# Patient Record
Sex: FEMALE | Race: BLACK OR AFRICAN AMERICAN | NOT HISPANIC OR LATINO | Employment: FULL TIME | URBAN - METROPOLITAN AREA
[De-identification: names, ages, dates, MRNs, and addresses within clinical notes are randomized per-mention and may not be internally consistent; named-entity substitution may affect disease eponyms.]

---

## 2019-04-19 ENCOUNTER — HOSPITAL ENCOUNTER (EMERGENCY)
Facility: HOSPITAL | Age: 29
Discharge: HOME | End: 2019-04-19
Attending: EMERGENCY MEDICINE
Payer: COMMERCIAL

## 2019-04-19 VITALS
TEMPERATURE: 101 F | HEART RATE: 114 BPM | OXYGEN SATURATION: 97 % | HEIGHT: 64 IN | SYSTOLIC BLOOD PRESSURE: 147 MMHG | BODY MASS INDEX: 25.33 KG/M2 | DIASTOLIC BLOOD PRESSURE: 79 MMHG | WEIGHT: 148.38 LBS | RESPIRATION RATE: 18 BRPM

## 2019-04-19 DIAGNOSIS — R09.81 SINUS CONGESTION: ICD-10-CM

## 2019-04-19 DIAGNOSIS — R50.9 FEVER, UNSPECIFIED FEVER CAUSE: ICD-10-CM

## 2019-04-19 DIAGNOSIS — R51.9 NONINTRACTABLE HEADACHE, UNSPECIFIED CHRONICITY PATTERN, UNSPECIFIED HEADACHE TYPE: Primary | ICD-10-CM

## 2019-04-19 LAB
INFLUENZA A, MOLECULAR: NEGATIVE
INFLUENZA B, MOLECULAR: NEGATIVE
SPECIMEN SOURCE: NORMAL

## 2019-04-19 PROCEDURE — 87502 INFLUENZA DNA AMP PROBE: CPT

## 2019-04-19 PROCEDURE — 25000003 PHARM REV CODE 250: Performed by: PHYSICIAN ASSISTANT

## 2019-04-19 PROCEDURE — 99284 EMERGENCY DEPT VISIT MOD MDM: CPT

## 2019-04-19 RX ORDER — AMOXICILLIN AND CLAVULANATE POTASSIUM 875; 125 MG/1; MG/1
1 TABLET, FILM COATED ORAL 2 TIMES DAILY
Qty: 20 TABLET | Refills: 0 | Status: SHIPPED | OUTPATIENT
Start: 2019-04-19 | End: 2019-04-29

## 2019-04-19 RX ORDER — BUTALBITAL, ACETAMINOPHEN AND CAFFEINE 50; 325; 40 MG/1; MG/1; MG/1
2 TABLET ORAL EVERY 8 HOURS PRN
Qty: 18 TABLET | Refills: 0 | Status: SHIPPED | OUTPATIENT
Start: 2019-04-19 | End: 2019-05-19

## 2019-04-19 RX ORDER — FLUTICASONE PROPIONATE 50 MCG
2 SPRAY, SUSPENSION (ML) NASAL DAILY
Qty: 15 G | Refills: 0 | Status: SHIPPED | OUTPATIENT
Start: 2019-04-19 | End: 2023-10-10

## 2019-04-19 RX ORDER — BUTALBITAL, ACETAMINOPHEN AND CAFFEINE 50; 325; 40 MG/1; MG/1; MG/1
2 TABLET ORAL
Status: COMPLETED | OUTPATIENT
Start: 2019-04-19 | End: 2019-04-19

## 2019-04-19 RX ORDER — IBUPROFEN 600 MG/1
600 TABLET ORAL
Status: COMPLETED | OUTPATIENT
Start: 2019-04-19 | End: 2019-04-19

## 2019-04-19 RX ADMIN — IBUPROFEN 600 MG: 600 TABLET, FILM COATED ORAL at 02:04

## 2019-04-19 RX ADMIN — BUTALBITAL, ACETAMINOPHEN, AND CAFFEINE 2 TABLET: 50; 325; 40 TABLET ORAL at 02:04

## 2023-01-23 ENCOUNTER — HOSPITAL ENCOUNTER (EMERGENCY)
Facility: HOSPITAL | Age: 33
Discharge: HOME | End: 2023-01-23
Attending: EMERGENCY MEDICINE
Payer: COMMERCIAL

## 2023-01-23 VITALS
BODY MASS INDEX: 21.53 KG/M2 | HEART RATE: 67 BPM | WEIGHT: 125.44 LBS | TEMPERATURE: 98 F | RESPIRATION RATE: 20 BRPM | SYSTOLIC BLOOD PRESSURE: 121 MMHG | OXYGEN SATURATION: 100 % | DIASTOLIC BLOOD PRESSURE: 71 MMHG

## 2023-01-23 DIAGNOSIS — N20.0 KIDNEY STONE: Primary | ICD-10-CM

## 2023-01-23 DIAGNOSIS — R10.9 FLANK PAIN: ICD-10-CM

## 2023-01-23 LAB
ALBUMIN SERPL BCP-MCNC: 4.7 G/DL (ref 3.5–5.2)
ALP SERPL-CCNC: 62 U/L (ref 55–135)
ALT SERPL W/O P-5'-P-CCNC: 8 U/L (ref 10–44)
ANION GAP SERPL CALC-SCNC: 12 MMOL/L (ref 8–16)
AST SERPL-CCNC: 17 U/L (ref 10–40)
B-HCG UR QL: NEGATIVE
BACTERIA #/AREA URNS HPF: ABNORMAL /HPF
BASOPHILS # BLD AUTO: 0.03 K/UL (ref 0–0.2)
BASOPHILS NFR BLD: 0.2 % (ref 0–1.9)
BILIRUB SERPL-MCNC: 0.2 MG/DL (ref 0.1–1)
BILIRUB UR QL STRIP: NEGATIVE
BUN SERPL-MCNC: 10 MG/DL (ref 6–20)
CALCIUM SERPL-MCNC: 9.5 MG/DL (ref 8.7–10.5)
CHLORIDE SERPL-SCNC: 105 MMOL/L (ref 95–110)
CLARITY UR: ABNORMAL
CO2 SERPL-SCNC: 22 MMOL/L (ref 23–29)
COLOR UR: YELLOW
CREAT SERPL-MCNC: 0.9 MG/DL (ref 0.5–1.4)
DIFFERENTIAL METHOD: ABNORMAL
EOSINOPHIL # BLD AUTO: 0 K/UL (ref 0–0.5)
EOSINOPHIL NFR BLD: 0.1 % (ref 0–8)
ERYTHROCYTE [DISTWIDTH] IN BLOOD BY AUTOMATED COUNT: 12.5 % (ref 11.5–14.5)
EST. GFR  (NO RACE VARIABLE): >60 ML/MIN/1.73 M^2
GLUCOSE SERPL-MCNC: 123 MG/DL (ref 70–110)
GLUCOSE UR QL STRIP: NEGATIVE
HCT VFR BLD AUTO: 39.2 % (ref 37–48.5)
HCV AB SERPL QL IA: NEGATIVE
HEP C VIRUS HOLD SPECIMEN: NORMAL
HGB BLD-MCNC: 13.1 G/DL (ref 12–16)
HGB UR QL STRIP: ABNORMAL
HIV 1+2 AB+HIV1 P24 AG SERPL QL IA: NEGATIVE
HYALINE CASTS #/AREA URNS LPF: 1 /LPF
IMM GRANULOCYTES # BLD AUTO: 0.05 K/UL (ref 0–0.04)
IMM GRANULOCYTES NFR BLD AUTO: 0.4 % (ref 0–0.5)
KETONES UR QL STRIP: ABNORMAL
LEUKOCYTE ESTERASE UR QL STRIP: NEGATIVE
LYMPHOCYTES # BLD AUTO: 1.7 K/UL (ref 1–4.8)
LYMPHOCYTES NFR BLD: 12.5 % (ref 18–48)
MCH RBC QN AUTO: 27.9 PG (ref 27–31)
MCHC RBC AUTO-ENTMCNC: 33.4 G/DL (ref 32–36)
MCV RBC AUTO: 84 FL (ref 82–98)
MICROSCOPIC COMMENT: ABNORMAL
MONOCYTES # BLD AUTO: 0.5 K/UL (ref 0.3–1)
MONOCYTES NFR BLD: 3.4 % (ref 4–15)
NEUTROPHILS # BLD AUTO: 11.2 K/UL (ref 1.8–7.7)
NEUTROPHILS NFR BLD: 83.4 % (ref 38–73)
NITRITE UR QL STRIP: NEGATIVE
NRBC BLD-RTO: 0 /100 WBC
PH UR STRIP: 6 [PH] (ref 5–8)
PLATELET # BLD AUTO: 273 K/UL (ref 150–450)
PMV BLD AUTO: 10.5 FL (ref 9.2–12.9)
POTASSIUM SERPL-SCNC: 3.6 MMOL/L (ref 3.5–5.1)
PROT SERPL-MCNC: 8 G/DL (ref 6–8.4)
PROT UR QL STRIP: ABNORMAL
RBC # BLD AUTO: 4.69 M/UL (ref 4–5.4)
RBC #/AREA URNS HPF: 11 /HPF (ref 0–4)
SODIUM SERPL-SCNC: 139 MMOL/L (ref 136–145)
SP GR UR STRIP: 1.03 (ref 1–1.03)
SQUAMOUS #/AREA URNS HPF: 17 /HPF
URN SPEC COLLECT METH UR: ABNORMAL
UROBILINOGEN UR STRIP-ACNC: NEGATIVE EU/DL
WBC # BLD AUTO: 13.43 K/UL (ref 3.9–12.7)
WBC #/AREA URNS HPF: 1 /HPF (ref 0–5)
WBC CLUMPS URNS QL MICRO: ABNORMAL

## 2023-01-23 PROCEDURE — 87389 HIV-1 AG W/HIV-1&-2 AB AG IA: CPT | Performed by: EMERGENCY MEDICINE

## 2023-01-23 PROCEDURE — 81025 URINE PREGNANCY TEST: CPT | Performed by: EMERGENCY MEDICINE

## 2023-01-23 PROCEDURE — 63600175 PHARM REV CODE 636 W HCPCS: Performed by: EMERGENCY MEDICINE

## 2023-01-23 PROCEDURE — 96372 THER/PROPH/DIAG INJ SC/IM: CPT | Performed by: EMERGENCY MEDICINE

## 2023-01-23 PROCEDURE — 86803 HEPATITIS C AB TEST: CPT | Performed by: EMERGENCY MEDICINE

## 2023-01-23 PROCEDURE — 99285 EMERGENCY DEPT VISIT HI MDM: CPT | Mod: 25

## 2023-01-23 PROCEDURE — 80053 COMPREHEN METABOLIC PANEL: CPT | Performed by: NURSE PRACTITIONER

## 2023-01-23 PROCEDURE — 81000 URINALYSIS NONAUTO W/SCOPE: CPT | Performed by: EMERGENCY MEDICINE

## 2023-01-23 PROCEDURE — 85025 COMPLETE CBC W/AUTO DIFF WBC: CPT | Performed by: NURSE PRACTITIONER

## 2023-01-23 RX ORDER — TAMSULOSIN HYDROCHLORIDE 0.4 MG/1
0.4 CAPSULE ORAL DAILY
Qty: 30 CAPSULE | Refills: 0 | Status: SHIPPED | OUTPATIENT
Start: 2023-01-23 | End: 2023-01-23 | Stop reason: SDUPTHER

## 2023-01-23 RX ORDER — TAMSULOSIN HYDROCHLORIDE 0.4 MG/1
0.4 CAPSULE ORAL DAILY
Qty: 30 CAPSULE | Refills: 0 | Status: SHIPPED | OUTPATIENT
Start: 2023-01-23 | End: 2023-10-10

## 2023-01-23 RX ORDER — KETOROLAC TROMETHAMINE 30 MG/ML
15 INJECTION, SOLUTION INTRAMUSCULAR; INTRAVENOUS
Status: COMPLETED | OUTPATIENT
Start: 2023-01-23 | End: 2023-01-23

## 2023-01-23 RX ORDER — PROMETHAZINE HYDROCHLORIDE 25 MG/1
25 TABLET ORAL EVERY 6 HOURS PRN
Qty: 15 TABLET | Refills: 0 | Status: SHIPPED | OUTPATIENT
Start: 2023-01-23 | End: 2023-01-23 | Stop reason: SDUPTHER

## 2023-01-23 RX ORDER — CEFUROXIME AXETIL 500 MG/1
500 TABLET ORAL 2 TIMES DAILY
Qty: 20 TABLET | Refills: 0 | Status: SHIPPED | OUTPATIENT
Start: 2023-01-23 | End: 2023-02-02

## 2023-01-23 RX ORDER — HYDROCODONE BITARTRATE AND ACETAMINOPHEN 5; 325 MG/1; MG/1
1 TABLET ORAL EVERY 4 HOURS PRN
Qty: 11 TABLET | Refills: 0 | Status: SHIPPED | OUTPATIENT
Start: 2023-01-23 | End: 2023-01-28

## 2023-01-23 RX ORDER — ONDANSETRON 2 MG/ML
8 INJECTION INTRAMUSCULAR; INTRAVENOUS
Status: DISCONTINUED | OUTPATIENT
Start: 2023-01-23 | End: 2023-01-23

## 2023-01-23 RX ORDER — HYDROCODONE BITARTRATE AND ACETAMINOPHEN 5; 325 MG/1; MG/1
1 TABLET ORAL EVERY 4 HOURS PRN
Qty: 11 TABLET | Refills: 0 | Status: SHIPPED | OUTPATIENT
Start: 2023-01-23 | End: 2023-01-23 | Stop reason: SDUPTHER

## 2023-01-23 RX ORDER — ONDANSETRON 2 MG/ML
4 INJECTION INTRAMUSCULAR; INTRAVENOUS
Status: COMPLETED | OUTPATIENT
Start: 2023-01-23 | End: 2023-01-23

## 2023-01-23 RX ORDER — PROMETHAZINE HYDROCHLORIDE 25 MG/1
25 TABLET ORAL EVERY 6 HOURS PRN
Qty: 15 TABLET | Refills: 0 | Status: SHIPPED | OUTPATIENT
Start: 2023-01-23 | End: 2023-10-10

## 2023-01-23 RX ORDER — CEFUROXIME AXETIL 500 MG/1
500 TABLET ORAL 2 TIMES DAILY
Qty: 20 TABLET | Refills: 0 | Status: SHIPPED | OUTPATIENT
Start: 2023-01-23 | End: 2023-01-23 | Stop reason: SDUPTHER

## 2023-01-23 RX ORDER — KETOROLAC TROMETHAMINE 30 MG/ML
15 INJECTION, SOLUTION INTRAMUSCULAR; INTRAVENOUS
Status: DISCONTINUED | OUTPATIENT
Start: 2023-01-23 | End: 2023-01-23

## 2023-01-23 RX ADMIN — ONDANSETRON 4 MG: 2 INJECTION INTRAMUSCULAR; INTRAVENOUS at 09:01

## 2023-01-23 RX ADMIN — KETOROLAC TROMETHAMINE 15 MG: 30 INJECTION, SOLUTION INTRAMUSCULAR; INTRAVENOUS at 09:01

## 2023-01-23 NOTE — ED PROVIDER NOTES
SCRIBE #1 NOTE: I, Fouzia Palmer, am scribing for, and in the presence of, Bernard Gracia MD. I have scribed the entire note.       History     Chief Complaint   Patient presents with    Flank Pain     Left side flank pain with N/V.      Review of patient's allergies indicates:  No Known Allergies      History of Present Illness     HPI    1/23/2023, 9:18 AM  History obtained from the patient      History of Present Illness: Waldemar Mendoza is a 32 y.o. female patient who presents to the Emergency Department for evaluation of L sided flank pain which onset today. Symptoms are constant and moderate in severity. No mitigating or exacerbating factors reported. Associated sxs include nausea and vomiting. Patient denies any fever, chills, CP, SOB, lightheadedness, numbness, weakness, HA, and all other sxs at this time. No prior Tx reported. No further complaints or concerns at this time.       Arrival mode: Personal vehicle    PCP: Primary Doctor No        Past Medical History:  No past medical history on file.    Past Surgical History:  No past surgical history on file.      Family History:  No family history on file.    Social History:  Social History     Tobacco Use    Smoking status: Not on file    Smokeless tobacco: Not on file   Substance and Sexual Activity    Alcohol use: Not on file    Drug use: Not on file    Sexual activity: Not on file        Review of Systems     Review of Systems   Constitutional:  Negative for fever.   HENT:  Negative for sore throat.    Respiratory:  Negative for shortness of breath.    Cardiovascular:  Negative for chest pain.   Gastrointestinal:  Positive for nausea and vomiting.   Genitourinary:  Positive for flank pain (L sided). Negative for dysuria.   Musculoskeletal:  Negative for back pain.   Skin:  Negative for rash.   Neurological:  Negative for weakness, light-headedness, numbness and headaches.   Hematological:  Does not bruise/bleed easily.   All other systems reviewed  and are negative.     Physical Exam     Initial Vitals [01/23/23 0759]   BP Pulse Resp Temp SpO2   121/71 67 20 97.8 °F (36.6 °C) 100 %      MAP       --          Physical Exam  Nursing Notes and Vital Signs Reviewed.  Constitutional: Patient is in no acute distress. Well-developed and well-nourished.  Head: Atraumatic. Normocephalic.  Eyes: PERRL. EOM intact. Conjunctivae are not pale. No scleral icterus.  ENT: Mucous membranes are moist.   Neck: Supple. Full ROM.   Cardiovascular: Regular rate. Regular rhythm. No murmurs, rubs, or gallops. Distal pulses are 2+ and symmetric.  Pulmonary/Chest: No respiratory distress. Clear to auscultation bilaterally. No wheezing or rales.  Abdominal: Soft and non-distended.  There is no tenderness.  No rebound, guarding, or rigidity.   Musculoskeletal: Moves all extremities. No obvious deformities. No edema.   Skin: Warm and dry.  Neurological:  Alert, awake, and appropriate.  Normal speech.  No acute focal neurological deficits are appreciated.  Psychiatric: Normal affect. Good eye contact. Appropriate in content.     ED Course   Procedures  ED Vital Signs:  Vitals:    01/23/23 0759   BP: 121/71   Pulse: 67   Resp: 20   Temp: 97.8 °F (36.6 °C)   TempSrc: Oral   SpO2: 100%   Weight: 56.9 kg (125 lb 7.1 oz)       Abnormal Lab Results:  Labs Reviewed   URINALYSIS, REFLEX TO URINE CULTURE - Abnormal; Notable for the following components:       Result Value    Appearance, UA Hazy (*)     Protein, UA 1+ (*)     Ketones, UA 1+ (*)     Occult Blood UA 3+ (*)     All other components within normal limits    Narrative:     Specimen Source->Urine   CBC W/ AUTO DIFFERENTIAL - Abnormal; Notable for the following components:    WBC 13.43 (*)     Gran # (ANC) 11.2 (*)     Immature Grans (Abs) 0.05 (*)     Gran % 83.4 (*)     Lymph % 12.5 (*)     Mono % 3.4 (*)     All other components within normal limits    Narrative:     Release to patient->Immediate   COMPREHENSIVE METABOLIC PANEL -  Abnormal; Notable for the following components:    CO2 22 (*)     Glucose 123 (*)     ALT 8 (*)     All other components within normal limits    Narrative:     Release to patient->Immediate   URINALYSIS MICROSCOPIC - Abnormal; Notable for the following components:    RBC, UA 11 (*)     WBC Clumps, UA Occasional (*)     All other components within normal limits    Narrative:     Specimen Source->Urine   PREGNANCY TEST, URINE RAPID    Narrative:     For women of childbearing age w/o hysterectomy  Specimen Source->Urine   HIV 1 / 2 ANTIBODY    Narrative:     Release to patient->Immediate   HEPATITIS C ANTIBODY    Narrative:     Release to patient->Immediate   HEP C VIRUS HOLD SPECIMEN    Narrative:     Release to patient->Immediate        All Lab Results:  Results for orders placed or performed during the hospital encounter of 01/23/23   Urinalysis, Reflex to Urine Culture Urine, Clean Catch    Specimen: Urine   Result Value Ref Range    Specimen UA Urine, Clean Catch     Color, UA Yellow Yellow, Straw, Caprice    Appearance, UA Hazy (A) Clear    pH, UA 6.0 5.0 - 8.0    Specific Gravity, UA 1.030 1.005 - 1.030    Protein, UA 1+ (A) Negative    Glucose, UA Negative Negative    Ketones, UA 1+ (A) Negative    Bilirubin (UA) Negative Negative    Occult Blood UA 3+ (A) Negative    Nitrite, UA Negative Negative    Urobilinogen, UA Negative <2.0 EU/dL    Leukocytes, UA Negative Negative   Pregnancy, urine rapid   Result Value Ref Range    Preg Test, Ur Negative    HIV 1/2 Ag/Ab (4th Gen)   Result Value Ref Range    HIV 1/2 Ag/Ab Negative Negative   Hepatitis C Antibody   Result Value Ref Range    Hepatitis C Ab Negative Negative   HCV Virus Hold Specimen   Result Value Ref Range    HEP C Virus Hold Specimen Hold for HCV sendout    CBC auto differential   Result Value Ref Range    WBC 13.43 (H) 3.90 - 12.70 K/uL    RBC 4.69 4.00 - 5.40 M/uL    Hemoglobin 13.1 12.0 - 16.0 g/dL    Hematocrit 39.2 37.0 - 48.5 %    MCV 84 82 - 98 fL     MCH 27.9 27.0 - 31.0 pg    MCHC 33.4 32.0 - 36.0 g/dL    RDW 12.5 11.5 - 14.5 %    Platelets 273 150 - 450 K/uL    MPV 10.5 9.2 - 12.9 fL    Immature Granulocytes 0.4 0.0 - 0.5 %    Gran # (ANC) 11.2 (H) 1.8 - 7.7 K/uL    Immature Grans (Abs) 0.05 (H) 0.00 - 0.04 K/uL    Lymph # 1.7 1.0 - 4.8 K/uL    Mono # 0.5 0.3 - 1.0 K/uL    Eos # 0.0 0.0 - 0.5 K/uL    Baso # 0.03 0.00 - 0.20 K/uL    nRBC 0 0 /100 WBC    Gran % 83.4 (H) 38.0 - 73.0 %    Lymph % 12.5 (L) 18.0 - 48.0 %    Mono % 3.4 (L) 4.0 - 15.0 %    Eosinophil % 0.1 0.0 - 8.0 %    Basophil % 0.2 0.0 - 1.9 %    Differential Method Automated    Comprehensive metabolic panel   Result Value Ref Range    Sodium 139 136 - 145 mmol/L    Potassium 3.6 3.5 - 5.1 mmol/L    Chloride 105 95 - 110 mmol/L    CO2 22 (L) 23 - 29 mmol/L    Glucose 123 (H) 70 - 110 mg/dL    BUN 10 6 - 20 mg/dL    Creatinine 0.9 0.5 - 1.4 mg/dL    Calcium 9.5 8.7 - 10.5 mg/dL    Total Protein 8.0 6.0 - 8.4 g/dL    Albumin 4.7 3.5 - 5.2 g/dL    Total Bilirubin 0.2 0.1 - 1.0 mg/dL    Alkaline Phosphatase 62 55 - 135 U/L    AST 17 10 - 40 U/L    ALT 8 (L) 10 - 44 U/L    Anion Gap 12 8 - 16 mmol/L    eGFR >60 >60 mL/min/1.73 m^2   Urinalysis Microscopic   Result Value Ref Range    RBC, UA 11 (H) 0 - 4 /hpf    WBC, UA 1 0 - 5 /hpf    WBC Clumps, UA Occasional (A) None-Rare    Bacteria Rare None-Occ /hpf    Squam Epithel, UA 17 /hpf    Hyaline Casts, UA 1 0-1/lpf /lpf    Microscopic Comment SEE COMMENT        Imaging Results:  Imaging Results               CT Renal Stone Study ABD Pelvis WO (Final result)  Result time 01/23/23 10:12:39      Final result by Massimo Rousseau MD (01/23/23 10:12:39)                   Impression:      Mild left-sided obstructive uropathy with a 4 mm stone in the distal left ureter.  Nephrolithiasis, as above.    Small collection of air in the right posterior pelvic subcutaneous soft tissues.  Correlate for recent injection.  Infectious etiology not completely excluded  in the appropriate clinical setting.    Additional findings as above.    This report was flagged in Epic as abnormal.      Electronically signed by: Massimo Rousseau  Date:    01/23/2023  Time:    10:12               Narrative:    EXAMINATION:  CT RENAL STONE STUDY ABD PELVIS WO    CLINICAL HISTORY:  Flank pain, kidney stone suspected;    TECHNIQUE:  Low dose axial images, sagittal and coronal reformations were obtained from the lung bases to the pubic symphysis.  Contrast was not administered.  All CT scans at this location are performed using dose modulation techniques as appropriate to a performed exam including the following: Automated exposure control; adjustment of the mA and/or kV according to patient size (this includes techniques or standardized protocols for targeted exams where dose is matched to indication/reason for exam; i. e. extremities or head); use of iterative reconstruction technique.    COMPARISON:  CT abdomen pelvis 12/21/2014    FINDINGS:  LOWER CHEST: Unremarkable.    HEPATOBILIARY: Mildly enlarged liver.  Unremarkable gallbladder. Unremarkable bile ducts.    SPLEEN/ADRENAL/PANCREAS: Unremarkable.    GENITOURINARY: Numerous tiny nephroliths at the right kidney, few tiny left nephroliths.  Stones measure on the order of 1-3 mm.  Mild left-sided hydronephrosis and hydroureter with a 4 mm stone in the distal left ureter approaching the left UVJ.  Right ureter demonstrates no significant abnormality.    RETROPERITONEUM:  No pathologic adenopathy.    BOWEL: No obstruction or inflammation. No free air or ascites.    VASCULAR: No abdominal aortic aneurysm.    BODY WALL: Small collection of air in the right posterior pelvic subcutaneous soft tissues.    BONES: No acute fracture or aggressive osseous lesion. Slight levoconvex spine curvature.  L5-S1 discogenic degenerative changes.                                              The Emergency Provider reviewed the vital signs and test results, which are  outlined above.     ED Discussion       10:31 AM: Reassessed pt at this time. Discussed with pt all pertinent ED information and results. Discussed pt dx and plan of tx. Gave pt all f/u and return to the ED instructions. All questions and concerns were addressed at this time. Pt expresses understanding of information and instructions, and is comfortable with plan to discharge. Pt is stable for discharge.    I discussed with patient and/or family/caretaker that evaluation in the ED does not suggest any emergent or life threatening medical conditions requiring immediate intervention beyond what was provided in the ED, and I believe patient is safe for discharge.  Regardless, an unremarkable evaluation in the ED does not preclude the development or presence of a serious of life threatening condition. As such, patient was instructed to return immediately for any worsening or change in current symptoms.       Medical Decision Making:   Initial Assessment:   Left sided flank pain  Differential Diagnosis:   Kidney stone, UTI  Clinical Tests:   Lab Tests: Ordered and Reviewed  Radiological Study: Ordered and Reviewed  ED Management:  CT shows Kidney stone.  Will prescribe, flomax, pain and nause meds.          ED Medication(s):  Medications   ketorolac injection 15 mg (15 mg Intramuscular Given 1/23/23 0934)   ondansetron injection 4 mg (4 mg Intramuscular Given 1/23/23 0934)       Current Discharge Medication List        START taking these medications    Details   cefUROXime (CEFTIN) 500 MG tablet Take 1 tablet (500 mg total) by mouth 2 (two) times daily. for 10 days  Qty: 20 tablet, Refills: 0      HYDROcodone-acetaminophen (NORCO) 5-325 mg per tablet Take 1 tablet by mouth every 4 (four) hours as needed for Pain.  Qty: 11 tablet, Refills: 0    Comments: Quantity prescribed more than 7 day supply? No      promethazine (PHENERGAN) 25 MG tablet Take 1 tablet (25 mg total) by mouth every 6 (six) hours as needed for  Nausea.  Qty: 15 tablet, Refills: 0      tamsulosin (FLOMAX) 0.4 mg Cap Take 1 capsule (0.4 mg total) by mouth once daily.  Qty: 30 capsule, Refills: 0              Follow-up Information       Schedule an appointment as soon as possible for a visit  with The [address]    Specialty: Internal Medicine  Contact information:  [id number] [facility] [address] [phone]  Additional information:  Please park on the [address] side and use the Clinic entrance. Check in on the 2nd floor, to the right.                               Scribe Attestation:   Scribe #1: I performed the above scribed service and the documentation accurately describes the services I performed. I attest to the accuracy of the note.     Attending:   Physician Attestation Statement for Scribe #1: I, Bernard Gracia MD, personally performed the services described in this documentation, as scribed by Fouzia Palmer, in my presence, and it is both accurate and complete.           Clinical Impression       ICD-10-CM ICD-9-CM   1. Kidney stone  N20.0 592.0   2. Flank pain  R10.9 789.09       Disposition:   Disposition: Discharged  Condition: Stable       Bernard Gracia MD  01/23/23 1100

## 2023-01-23 NOTE — FIRST PROVIDER EVALUATION
Medical screening examination initiated.  I have conducted a focused provider triage encounter, findings are as follows:    Brief history of present illness:  32-year-old female with complaint of sudden onset of left lower back pain with radiation left lower abdomen since this morning.  Patient reports nausea and vomiting with the pain.    Vitals:    01/23/23 0759   BP: 121/71   BP Location: Right arm   Patient Position: Sitting   Pulse: 67   Resp: 20   Temp: 97.8 °F (36.6 °C)   TempSrc: Oral   SpO2: 100%   Weight: 56.9 kg (125 lb 7.1 oz)       Pertinent physical exam:  no abdominal tenderness

## 2023-10-03 ENCOUNTER — OFFICE VISIT (OUTPATIENT)
Dept: INTERNAL MEDICINE | Facility: CLINIC | Age: 33
End: 2023-10-03
Payer: COMMERCIAL

## 2023-10-03 VITALS
HEART RATE: 86 BPM | TEMPERATURE: 98 F | DIASTOLIC BLOOD PRESSURE: 62 MMHG | WEIGHT: 118.19 LBS | BODY MASS INDEX: 20.18 KG/M2 | HEIGHT: 64 IN | OXYGEN SATURATION: 96 % | RESPIRATION RATE: 18 BRPM | SYSTOLIC BLOOD PRESSURE: 104 MMHG

## 2023-10-03 DIAGNOSIS — R53.83 FATIGUE, UNSPECIFIED TYPE: ICD-10-CM

## 2023-10-03 DIAGNOSIS — R55 SYNCOPE, UNSPECIFIED SYNCOPE TYPE: ICD-10-CM

## 2023-10-03 DIAGNOSIS — R63.4 WEIGHT LOSS, UNINTENTIONAL: ICD-10-CM

## 2023-10-03 DIAGNOSIS — G43.909 MIGRAINE WITHOUT STATUS MIGRAINOSUS, NOT INTRACTABLE, UNSPECIFIED MIGRAINE TYPE: Primary | ICD-10-CM

## 2023-10-03 DIAGNOSIS — Z13.220 SCREENING FOR LIPOID DISORDERS: ICD-10-CM

## 2023-10-03 DIAGNOSIS — Z01.419 WELL FEMALE EXAM WITH ROUTINE GYNECOLOGICAL EXAM: ICD-10-CM

## 2023-10-03 PROCEDURE — 3078F DIAST BP <80 MM HG: CPT | Mod: CPTII,S$GLB,, | Performed by: FAMILY MEDICINE

## 2023-10-03 PROCEDURE — 3008F BODY MASS INDEX DOCD: CPT | Mod: CPTII,S$GLB,, | Performed by: FAMILY MEDICINE

## 2023-10-03 PROCEDURE — 99999 PR PBB SHADOW E&M-EST. PATIENT-LVL V: CPT | Mod: PBBFAC,,, | Performed by: FAMILY MEDICINE

## 2023-10-03 PROCEDURE — 3078F PR MOST RECENT DIASTOLIC BLOOD PRESSURE < 80 MM HG: ICD-10-PCS | Mod: CPTII,S$GLB,, | Performed by: FAMILY MEDICINE

## 2023-10-03 PROCEDURE — 3008F PR BODY MASS INDEX (BMI) DOCUMENTED: ICD-10-PCS | Mod: CPTII,S$GLB,, | Performed by: FAMILY MEDICINE

## 2023-10-03 PROCEDURE — 3074F PR MOST RECENT SYSTOLIC BLOOD PRESSURE < 130 MM HG: ICD-10-PCS | Mod: CPTII,S$GLB,, | Performed by: FAMILY MEDICINE

## 2023-10-03 PROCEDURE — 99204 OFFICE O/P NEW MOD 45 MIN: CPT | Mod: S$GLB,,, | Performed by: FAMILY MEDICINE

## 2023-10-03 PROCEDURE — 99204 PR OFFICE/OUTPT VISIT, NEW, LEVL IV, 45-59 MIN: ICD-10-PCS | Mod: S$GLB,,, | Performed by: FAMILY MEDICINE

## 2023-10-03 PROCEDURE — 99999 PR PBB SHADOW E&M-EST. PATIENT-LVL V: ICD-10-PCS | Mod: PBBFAC,,, | Performed by: FAMILY MEDICINE

## 2023-10-03 PROCEDURE — 3074F SYST BP LT 130 MM HG: CPT | Mod: CPTII,S$GLB,, | Performed by: FAMILY MEDICINE

## 2023-10-03 RX ORDER — ACETAMINOPHEN 500 MG
500 TABLET ORAL EVERY 6 HOURS PRN
COMMUNITY
End: 2023-10-10

## 2023-10-03 RX ORDER — IBUPROFEN 200 MG
200 TABLET ORAL EVERY 6 HOURS PRN
COMMUNITY

## 2023-10-03 NOTE — PATIENT INSTRUCTIONS
Try 3-5 minutes daily of guided meditation  [employer] - has free 10 day introduction  Simply Being  Calm  Simple Habit     When you feel yourself getting anxious/stressed take a breathing time out. Breathe in to count of 4, hold for count of 2, breathe out for count of 8; repeat 10 times and it should help you feel more calm.

## 2023-10-03 NOTE — PROGRESS NOTES
Subjective:       Patient ID: Waldemar Mendoza is a 33 y.o. female here today to establish care.    Chief Complaint: Establish Care and Headache    Patient presents to clinic today to establish care. Has seen Neurology for HA in approx . Diagnosed with migraine. Tried several different medications.     Wt Readings from Last 10 Encounters:  10/03/23 : 53.6 kg (118 lb 2.7 oz)  23 : 56.9 kg (125 lb 7.1 oz)  19 : 67.3 kg (148 lb 5.9 oz)    Reports weight 179 lbs in 2018 - she was seen by doctor and had normal lab evaluation at that time.    Reports no appetite - reports early satiety    She reports her mother  of lung cancer at age 45. Her brother is currently being treated for thyroid cancer, age 30.          Review of Systems   Constitutional:  Positive for fatigue (chronic) and unexpected weight change (chronic). Negative for chills and fever.   HENT:  Negative for congestion, dental problem, ear pain, hearing loss, rhinorrhea and trouble swallowing.    Eyes:  Negative for pain and visual disturbance.   Respiratory:  Negative for cough and shortness of breath.    Cardiovascular:  Negative for chest pain, palpitations and leg swelling.   Gastrointestinal:  Positive for abdominal pain (chronic, intermittent, h/o kidney stones). Negative for abdominal distention, blood in stool, constipation, diarrhea, nausea and vomiting.   Genitourinary:  Negative for difficulty urinating and vaginal discharge.   Musculoskeletal:  Negative for arthralgias and myalgias.   Skin:  Negative for rash.   Neurological:  Positive for syncope (7-8 episodes over last 2-3 years; LOC lasts seconds; reports prior to episodes feels hot, has abdominal pain and feels the need to have BM) and headaches (chronic). Negative for dizziness, weakness and numbness.   Hematological:  Positive for adenopathy (chronic, intermittent). Does not bruise/bleed easily.   Psychiatric/Behavioral:  Positive for sleep disturbance (chronic).  Negative for dysphoric mood. The patient is nervous/anxious.        Objective:      Physical Exam  Vitals reviewed.   Constitutional:       General: She is not in acute distress.     Appearance: She is well-developed.   HENT:      Head: Normocephalic and atraumatic.   Eyes:      General: Lids are normal. No scleral icterus.     Extraocular Movements: Extraocular movements intact.      Conjunctiva/sclera: Conjunctivae normal.      Pupils: Pupils are equal, round, and reactive to light.   Cardiovascular:      Rate and Rhythm: Normal rate and regular rhythm.      Heart sounds: No murmur heard.     No friction rub. No gallop.   Pulmonary:      Effort: Pulmonary effort is normal.      Breath sounds: Normal breath sounds. No decreased breath sounds, wheezing, rhonchi or rales.   Abdominal:      General: Bowel sounds are normal.      Palpations: Abdomen is soft. There is no mass.      Tenderness: There is no abdominal tenderness.   Neurological:      Mental Status: She is alert and oriented to person, place, and time.      Cranial Nerves: No cranial nerve deficit.      Gait: Gait normal.   Psychiatric:         Mood and Affect: Mood and affect normal.         Assessment:       1. Migraine without status migrainosus, not intractable, unspecified migraine type    2. Syncope, unspecified syncope type    3. Weight loss, unintentional    4. Fatigue, unspecified type    5. Screening for lipoid disorders    6. Well female exam with routine gynecological exam        Plan:   1. Migraine without status migrainosus, not intractable, unspecified migraine type  -     Ambulatory referral/consult to Neurology; Future; Expected date: 10/10/2023    2. Syncope, unspecified syncope type  -     Ambulatory referral/consult to Cardiology; Future; Expected date: 10/10/2023    3. Weight loss, unintentional  -     Comprehensive Metabolic Panel; Future; Expected date: 10/03/2023    4. Fatigue, unspecified type  -     CBC Auto Differential; Future;  Expected date: 10/03/2023  -     Ferritin; Future; Expected date: 10/03/2023  -     Iron and TIBC; Future; Expected date: 10/03/2023  -     Vitamin D; Future; Expected date: 10/03/2023  -     TSH; Future; Expected date: 10/03/2023  -     T4, Free; Future; Expected date: 10/03/2023    5. Screening for lipoid disorders  -     Lipid Panel; Future; Expected date: 10/03/2023    6. Well female exam with routine gynecological exam  -     Ambulatory referral/consult to Obstetrics / Gynecology; Future; Expected date: 10/10/2023    Discussed stress management techniques.   Patient will return in 1 month for annual.  Health Maintenance reviewed/updated.

## 2023-10-09 DIAGNOSIS — Z76.89 ENCOUNTER TO ESTABLISH CARE: ICD-10-CM

## 2023-10-09 DIAGNOSIS — Z00.00 ROUTINE ADULT HEALTH MAINTENANCE: Primary | ICD-10-CM

## 2023-10-10 ENCOUNTER — OFFICE VISIT (OUTPATIENT)
Dept: OBSTETRICS AND GYNECOLOGY | Facility: CLINIC | Age: 33
End: 2023-10-10
Payer: COMMERCIAL

## 2023-10-10 ENCOUNTER — OFFICE VISIT (OUTPATIENT)
Dept: CARDIOLOGY | Facility: CLINIC | Age: 33
End: 2023-10-10
Payer: COMMERCIAL

## 2023-10-10 ENCOUNTER — HOSPITAL ENCOUNTER (OUTPATIENT)
Dept: CARDIOLOGY | Facility: HOSPITAL | Age: 33
Discharge: HOME | End: 2023-10-10
Attending: INTERNAL MEDICINE
Payer: COMMERCIAL

## 2023-10-10 VITALS
SYSTOLIC BLOOD PRESSURE: 106 MMHG | HEIGHT: 64 IN | BODY MASS INDEX: 20.06 KG/M2 | DIASTOLIC BLOOD PRESSURE: 68 MMHG | WEIGHT: 117.5 LBS

## 2023-10-10 VITALS
HEIGHT: 64 IN | SYSTOLIC BLOOD PRESSURE: 112 MMHG | DIASTOLIC BLOOD PRESSURE: 70 MMHG | HEART RATE: 105 BPM | OXYGEN SATURATION: 99 % | WEIGHT: 117.31 LBS | BODY MASS INDEX: 20.03 KG/M2

## 2023-10-10 DIAGNOSIS — Z01.419 ROUTINE GYNECOLOGICAL EXAMINATION: Primary | ICD-10-CM

## 2023-10-10 DIAGNOSIS — Z30.013 INITIATION OF DEPO PROVERA: ICD-10-CM

## 2023-10-10 DIAGNOSIS — Z76.89 ENCOUNTER TO ESTABLISH CARE: ICD-10-CM

## 2023-10-10 DIAGNOSIS — Z01.419 WELL FEMALE EXAM WITH ROUTINE GYNECOLOGICAL EXAM: ICD-10-CM

## 2023-10-10 DIAGNOSIS — G89.29 CHRONIC NONINTRACTABLE HEADACHE, UNSPECIFIED HEADACHE TYPE: ICD-10-CM

## 2023-10-10 DIAGNOSIS — R51.9 CHRONIC NONINTRACTABLE HEADACHE, UNSPECIFIED HEADACHE TYPE: ICD-10-CM

## 2023-10-10 DIAGNOSIS — Z72.0 TOBACCO USE: ICD-10-CM

## 2023-10-10 DIAGNOSIS — Z00.00 ROUTINE ADULT HEALTH MAINTENANCE: ICD-10-CM

## 2023-10-10 DIAGNOSIS — Z12.4 PAPANICOLAOU SMEAR FOR CERVICAL CANCER SCREENING: ICD-10-CM

## 2023-10-10 DIAGNOSIS — R55 SYNCOPE, UNSPECIFIED SYNCOPE TYPE: Primary | ICD-10-CM

## 2023-10-10 DIAGNOSIS — Z11.3 SCREEN FOR STD (SEXUALLY TRANSMITTED DISEASE): ICD-10-CM

## 2023-10-10 LAB
B-HCG UR QL: NEGATIVE
CTP QC/QA: YES

## 2023-10-10 PROCEDURE — 1159F MED LIST DOCD IN RCRD: CPT | Mod: CPTII,S$GLB,, | Performed by: NURSE PRACTITIONER

## 2023-10-10 PROCEDURE — 99385 PREV VISIT NEW AGE 18-39: CPT | Mod: 25,S$GLB,, | Performed by: NURSE PRACTITIONER

## 2023-10-10 PROCEDURE — 99204 OFFICE O/P NEW MOD 45 MIN: CPT | Mod: S$GLB,,, | Performed by: INTERNAL MEDICINE

## 2023-10-10 PROCEDURE — 3008F BODY MASS INDEX DOCD: CPT | Mod: CPTII,S$GLB,, | Performed by: NURSE PRACTITIONER

## 2023-10-10 PROCEDURE — 99999 PR PBB SHADOW E&M-EST. PATIENT-LVL III: CPT | Mod: PBBFAC,,, | Performed by: NURSE PRACTITIONER

## 2023-10-10 PROCEDURE — 3008F PR BODY MASS INDEX (BMI) DOCUMENTED: ICD-10-PCS | Mod: CPTII,S$GLB,, | Performed by: NURSE PRACTITIONER

## 2023-10-10 PROCEDURE — 3008F PR BODY MASS INDEX (BMI) DOCUMENTED: ICD-10-PCS | Mod: CPTII,S$GLB,, | Performed by: INTERNAL MEDICINE

## 2023-10-10 PROCEDURE — 3078F DIAST BP <80 MM HG: CPT | Mod: CPTII,S$GLB,, | Performed by: INTERNAL MEDICINE

## 2023-10-10 PROCEDURE — 99999 PR PBB SHADOW E&M-EST. PATIENT-LVL IV: CPT | Mod: PBBFAC,,, | Performed by: INTERNAL MEDICINE

## 2023-10-10 PROCEDURE — 88175 CYTOPATH C/V AUTO FLUID REDO: CPT | Performed by: NURSE PRACTITIONER

## 2023-10-10 PROCEDURE — 3078F DIAST BP <80 MM HG: CPT | Mod: CPTII,S$GLB,, | Performed by: NURSE PRACTITIONER

## 2023-10-10 PROCEDURE — 3008F BODY MASS INDEX DOCD: CPT | Mod: CPTII,S$GLB,, | Performed by: INTERNAL MEDICINE

## 2023-10-10 PROCEDURE — 96372 PR INJECTION,THERAP/PROPH/DIAG2ST, IM OR SUBCUT: ICD-10-PCS | Mod: S$GLB,,, | Performed by: NURSE PRACTITIONER

## 2023-10-10 PROCEDURE — 87624 HPV HI-RISK TYP POOLED RSLT: CPT | Performed by: NURSE PRACTITIONER

## 2023-10-10 PROCEDURE — 93010 EKG 12-LEAD: ICD-10-PCS | Mod: ,,, | Performed by: INTERNAL MEDICINE

## 2023-10-10 PROCEDURE — 1159F MED LIST DOCD IN RCRD: CPT | Mod: CPTII,S$GLB,, | Performed by: INTERNAL MEDICINE

## 2023-10-10 PROCEDURE — 3074F SYST BP LT 130 MM HG: CPT | Mod: CPTII,S$GLB,, | Performed by: NURSE PRACTITIONER

## 2023-10-10 PROCEDURE — 96372 THER/PROPH/DIAG INJ SC/IM: CPT | Mod: S$GLB,,, | Performed by: NURSE PRACTITIONER

## 2023-10-10 PROCEDURE — 1160F PR REVIEW ALL MEDS BY PRESCRIBER/CLIN PHARMACIST DOCUMENTED: ICD-10-PCS | Mod: CPTII,S$GLB,, | Performed by: INTERNAL MEDICINE

## 2023-10-10 PROCEDURE — 99204 PR OFFICE/OUTPT VISIT, NEW, LEVL IV, 45-59 MIN: ICD-10-PCS | Mod: S$GLB,,, | Performed by: INTERNAL MEDICINE

## 2023-10-10 PROCEDURE — 99999 PR PBB SHADOW E&M-EST. PATIENT-LVL IV: ICD-10-PCS | Mod: PBBFAC,,, | Performed by: INTERNAL MEDICINE

## 2023-10-10 PROCEDURE — 81025 URINE PREGNANCY TEST: CPT | Mod: S$GLB,,, | Performed by: NURSE PRACTITIONER

## 2023-10-10 PROCEDURE — 93005 ELECTROCARDIOGRAM TRACING: CPT

## 2023-10-10 PROCEDURE — 1160F PR REVIEW ALL MEDS BY PRESCRIBER/CLIN PHARMACIST DOCUMENTED: ICD-10-PCS | Mod: CPTII,S$GLB,, | Performed by: NURSE PRACTITIONER

## 2023-10-10 PROCEDURE — 3074F SYST BP LT 130 MM HG: CPT | Mod: CPTII,S$GLB,, | Performed by: INTERNAL MEDICINE

## 2023-10-10 PROCEDURE — 1159F PR MEDICATION LIST DOCUMENTED IN MEDICAL RECORD: ICD-10-PCS | Mod: CPTII,S$GLB,, | Performed by: INTERNAL MEDICINE

## 2023-10-10 PROCEDURE — 1159F PR MEDICATION LIST DOCUMENTED IN MEDICAL RECORD: ICD-10-PCS | Mod: CPTII,S$GLB,, | Performed by: NURSE PRACTITIONER

## 2023-10-10 PROCEDURE — 3078F PR MOST RECENT DIASTOLIC BLOOD PRESSURE < 80 MM HG: ICD-10-PCS | Mod: CPTII,S$GLB,, | Performed by: NURSE PRACTITIONER

## 2023-10-10 PROCEDURE — 99385 PR PREVENTIVE VISIT,NEW,18-39: ICD-10-PCS | Mod: 25,S$GLB,, | Performed by: NURSE PRACTITIONER

## 2023-10-10 PROCEDURE — 3074F PR MOST RECENT SYSTOLIC BLOOD PRESSURE < 130 MM HG: ICD-10-PCS | Mod: CPTII,S$GLB,, | Performed by: NURSE PRACTITIONER

## 2023-10-10 PROCEDURE — 99999 PR PBB SHADOW E&M-EST. PATIENT-LVL III: ICD-10-PCS | Mod: PBBFAC,,, | Performed by: NURSE PRACTITIONER

## 2023-10-10 PROCEDURE — 3078F PR MOST RECENT DIASTOLIC BLOOD PRESSURE < 80 MM HG: ICD-10-PCS | Mod: CPTII,S$GLB,, | Performed by: INTERNAL MEDICINE

## 2023-10-10 PROCEDURE — 1160F RVW MEDS BY RX/DR IN RCRD: CPT | Mod: CPTII,S$GLB,, | Performed by: INTERNAL MEDICINE

## 2023-10-10 PROCEDURE — 1160F RVW MEDS BY RX/DR IN RCRD: CPT | Mod: CPTII,S$GLB,, | Performed by: NURSE PRACTITIONER

## 2023-10-10 PROCEDURE — 3074F PR MOST RECENT SYSTOLIC BLOOD PRESSURE < 130 MM HG: ICD-10-PCS | Mod: CPTII,S$GLB,, | Performed by: INTERNAL MEDICINE

## 2023-10-10 PROCEDURE — 93010 ELECTROCARDIOGRAM REPORT: CPT | Mod: ,,, | Performed by: INTERNAL MEDICINE

## 2023-10-10 PROCEDURE — 81025 POCT URINE PREGNANCY: ICD-10-PCS | Mod: S$GLB,,, | Performed by: NURSE PRACTITIONER

## 2023-10-10 RX ORDER — MEDROXYPROGESTERONE ACETATE 150 MG/ML
150 INJECTION, SUSPENSION INTRAMUSCULAR
Status: ACTIVE | OUTPATIENT
Start: 2023-10-10 | End: 2025-01-02

## 2023-10-10 RX ADMIN — MEDROXYPROGESTERONE ACETATE 150 MG: 150 INJECTION, SUSPENSION INTRAMUSCULAR at 08:10

## 2023-10-10 NOTE — PROGRESS NOTES
"  Subjective:       Patient ID: Waldemar Mendoza is a 33 y.o. female.    Chief Complaint:  Well Woman and Annual Exam      History of Present Illness  HPI  Presents today for WWE  Desires std screening and restart depo provera,   Health Maintenance   Topic Date Due    Lipid Panel  Never done    TETANUS VACCINE  07/01/2018    Hepatitis C Screening  Completed     GYN & OB History  Patient's last menstrual period was 10/07/2023 (exact date).   Date of Last Pap: today     OB History   No obstetric history on file.       Review of Systems  Review of Systems        Objective:   /68   Ht 5' 4" (1.626 m)   Wt 53.3 kg (117 lb 8.1 oz)   LMP 10/07/2023 (Exact Date)   BMI 20.17 kg/m²    Physical Exam:   Constitutional: She is oriented to person, place, and time. She appears well-developed and well-nourished.        Pulmonary/Chest: Right breast exhibits no inverted nipple, no mass, no nipple discharge, no skin change, no tenderness and no swelling. Left breast exhibits no inverted nipple, no mass, no nipple discharge, no skin change, no tenderness and no swelling.        Abdominal: Soft.     Genitourinary:    Inguinal canal and vagina normal.      Pelvic exam was performed with patient supine.   The external female genitalia was normal.   Genitalia hair distrobution normal .   Labial bartholins normal.Cervix is normal. No erythema,  no vaginal discharge, bleeding, rectocele, cystocele or unspecified prolapse of vaginal walls in the vagina.    No foreign body in the vagina.      No signs of injury in the vagina.      pap smear completed              Neurological: She is alert and oriented to person, place, and time.    Skin: Skin is warm and dry.    Psychiatric: She has a normal mood and affect. Her behavior is normal. Judgment and thought content normal.      Assessment:        1. Routine gynecological examination    2. Well female exam with routine gynecological exam    3. Papanicolaou smear for cervical cancer " screening    4. Screen for STD (sexually transmitted disease)    5. Initiation of Depo Provera               Plan:           Waldemar was seen today for well woman and annual exam.    Diagnoses and all orders for this visit:    Routine gynecological examination    Well female exam with routine gynecological exam  -     Ambulatory referral/consult to Obstetrics / Gynecology    Papanicolaou smear for cervical cancer screening  -     Liquid-Based Pap Smear, Screening  -     HPV High Risk Genotypes, PCR    Screen for STD (sexually transmitted disease)  -     C. trachomatis/N. gonorrhoeae by AMP DNA [facility]; Urine  -     HIV 1/2 Ag/Ab (4th Gen); Future  -     RPR; Future  -     Hepatitis Panel, Acute; Future    Initiation of Depo Provera  -     POCT Urine Pregnancy  -     medroxyPROGESTERone (DEPO-PROVERA) injection 150 mg      Return to clinic in one year for WWE

## 2023-10-10 NOTE — PROGRESS NOTES
Subjective:   Patient ID:  Waldemar Mendoza is a 33 y.o. female who presents for cardiac consult of No chief complaint on file.      Referral by: [place]  [id number] [facility] [place] [id number]     Reason for consult: syncope      HPI  The patient came in today for cardiac consult of No chief complaint on file.    10/10/23  Waldemar Mendoza is a 33 y.o. female pt with migraines presents for CV eval of syncope.     BP stable.  today. BMI 20 - 117 lbs.     She had first syncopal episode last year. She was laying in bed/watching TV - she started to have stomach pain  - she was sitting on toilet and had passed out, she had a prodrome of room darkening.   She can also feel it coming on now and tries to cool herself off.     She had a lot of weight loss - > 60 lbs. She is  at [employer] - works 10/hours day.     ECG - sinus tach V rate 105    Patient is compliant with medications.    No results found for this or any previous visit.      No results found for this or any previous visit.      No results found for this or any previous visit.      No cardiac monitor results found for the past 12 months         History reviewed. No pertinent past medical history.    History reviewed. No pertinent surgical history.    Social History     Tobacco Use    Smoking status: Every Day     Types: Vaping with nicotine    Smokeless tobacco: Never   Substance Use Topics    Alcohol use: Yes     Alcohol/week: 1.0 standard drink of alcohol     Types: 1 Drinks containing 0.5 oz of alcohol per week    Drug use: Yes     Frequency: 3.0 times per week     Types: Marijuana       Family History   Problem Relation Age of Onset    Asthma Maternal Grandmother     COPD Maternal Grandmother     Diabetes Maternal Grandmother     Drug abuse Maternal Grandmother     Cancer Mother     Drug abuse Mother     Early death Mother     Asthma Brother     Cancer Brother     Thyroid cancer Brother     Cancer Maternal  "Uncle        Patient's Medications   New Prescriptions    No medications on file   Previous Medications    IBUPROFEN (ADVIL,MOTRIN) 200 MG TABLET    Take 200 mg by mouth every 6 (six) hours as needed for Pain.   Modified Medications    No medications on file   Discontinued Medications    No medications on file       Review of Systems   Constitutional: Negative.    HENT: Negative.     Eyes: Negative.    Respiratory: Negative.     Cardiovascular: Negative.    Gastrointestinal: Negative.    Genitourinary: Negative.    Musculoskeletal: Negative.    Skin: Negative.    Neurological:  Positive for dizziness and loss of consciousness.   Endo/Heme/Allergies: Negative.    Psychiatric/Behavioral: Negative.     All 12 systems otherwise negative.      Wt Readings from Last 3 Encounters:   10/10/23 53.2 kg (117 lb 4.6 oz)   10/10/23 53.3 kg (117 lb 8.1 oz)   10/03/23 53.6 kg (118 lb 2.7 oz)     Temp Readings from Last 3 Encounters:   10/03/23 98.2 °F (36.8 °C) (Tympanic)   01/23/23 97.8 °F (36.6 °C) (Oral)   04/19/19 (!) 100.6 °F (38.1 °C) (Oral)     BP Readings from Last 3 Encounters:   10/10/23 112/70   10/10/23 106/68   10/03/23 104/62     Pulse Readings from Last 3 Encounters:   10/10/23 105   10/03/23 86   01/23/23 67       /70 (BP Location: Left arm, Patient Position: Sitting, BP Method: Medium (Manual))   Pulse 105   Ht 5' 4" (1.626 m)   Wt 53.2 kg (117 lb 4.6 oz)   LMP 10/07/2023 (Exact Date)   SpO2 99%   BMI 20.13 kg/m²     Objective:   Physical Exam  Vitals and nursing note reviewed.   Constitutional:       General: She is not in acute distress.     Appearance: She is well-developed. She is not diaphoretic.   HENT:      Head: Normocephalic and atraumatic.      Nose: Nose normal.   Eyes:      General: No scleral icterus.     Conjunctiva/sclera: Conjunctivae normal.   Neck:      Thyroid: No thyromegaly.      Vascular: No JVD.   Cardiovascular:      Rate and Rhythm: Normal rate and regular rhythm.      Heart " "sounds: S1 normal and S2 normal. No murmur heard.     No friction rub. No gallop. No S3 or S4 sounds.   Pulmonary:      Effort: Pulmonary effort is normal. No respiratory distress.      Breath sounds: Normal breath sounds. No stridor. No wheezing or rales.   Chest:      Chest wall: No tenderness.   Abdominal:      General: Bowel sounds are normal. There is no distension.      Palpations: Abdomen is soft. There is no mass.      Tenderness: There is no abdominal tenderness. There is no rebound.   Genitourinary:     Comments: Deferred  Musculoskeletal:         General: No tenderness or deformity. Normal range of motion.      Cervical back: Normal range of motion and neck supple.   Lymphadenopathy:      Cervical: No cervical adenopathy.   Skin:     General: Skin is warm and dry.      Coloration: Skin is not pale.      Findings: No erythema or rash.   Neurological:      Mental Status: She is alert and oriented to person, place, and time.      Motor: No abnormal muscle tone.      Coordination: Coordination normal.   Psychiatric:         Behavior: Behavior normal.         Thought Content: Thought content normal.         Judgment: Judgment normal.         Lab Results   Component Value Date     01/23/2023    K 3.6 01/23/2023     01/23/2023    CO2 22 (L) 01/23/2023    BUN 10 01/23/2023    CREATININE 0.9 01/23/2023     (H) 01/23/2023    AST 17 01/23/2023    ALT 8 (L) 01/23/2023    ALBUMIN 4.7 01/23/2023    PROT 8.0 01/23/2023    BILITOT 0.2 01/23/2023    WBC 13.43 (H) 01/23/2023    HGB 13.1 01/23/2023    HCT 39.2 01/23/2023    MCV 84 01/23/2023     01/23/2023         No results found for: "BNP", "INR"       Assessment:      1. Syncope, unspecified syncope type    2. Chronic nonintractable headache, unspecified headache type    3. Tobacco use        Plan:     Syncope - likely vasovagal  - rec inc fluid intake/ Liquid IV packets   - has lost > 60 lbs   - rec compression stockings, inc salt intake   - " order ECHO and 14 days Vital  - if recurrent rec ER eval     2. Migraines  - cont tx - rec neuro eval if recurrent     3. Tobacco use  - rec cessation     Thank you for allowing me to participate in this patient's care. Please do not hesitate to contact me with any questions or concerns. Consult note has been forwarded to the referral physician.

## 2023-10-11 ENCOUNTER — TELEPHONE (OUTPATIENT)
Dept: OBSTETRICS AND GYNECOLOGY | Facility: CLINIC | Age: 33
End: 2023-10-11
Payer: COMMERCIAL

## 2023-10-11 DIAGNOSIS — Z11.3 SCREENING EXAMINATION FOR STD (SEXUALLY TRANSMITTED DISEASE): Primary | ICD-10-CM

## 2023-10-11 NOTE — TELEPHONE ENCOUNTER
Spoke to  Matthew. GC of urine was canceled due to not enough urine collected. Message sent to provider.

## 2023-10-11 NOTE — TELEPHONE ENCOUNTER
----- Message from Tommy Bernstein sent at 10/11/2023  1:28 AM CDT -----  Regarding: Client Services  Contact: [phone]  Good Morning,     My name is Tommy Bernstein I work in the Lab Client Services. We had a problem with some lab work on this patient. If someone from your office could call us at [phone] or [phone] that would be great. Anyone in my department can help.      Thank you,

## 2023-10-13 PROBLEM — R63.4 WEIGHT LOSS, UNINTENTIONAL: Status: ACTIVE | Noted: 2023-10-13

## 2023-10-13 PROBLEM — G43.909 MIGRAINE WITHOUT STATUS MIGRAINOSUS, NOT INTRACTABLE: Status: ACTIVE | Noted: 2023-10-13

## 2023-10-13 PROBLEM — R55 SYNCOPE: Status: ACTIVE | Noted: 2023-10-13

## 2023-10-13 PROBLEM — R53.83 FATIGUE: Status: ACTIVE | Noted: 2023-10-13

## 2023-10-13 LAB
FINAL PATHOLOGIC DIAGNOSIS: NORMAL
Lab: NORMAL

## 2023-10-16 LAB
HPV HR 12 DNA SPEC QL NAA+PROBE: NEGATIVE
HPV16 AG SPEC QL: NEGATIVE
HPV18 DNA SPEC QL NAA+PROBE: NEGATIVE

## 2023-10-17 ENCOUNTER — PATIENT MESSAGE (OUTPATIENT)
Dept: INTERNAL MEDICINE | Facility: CLINIC | Age: 33
End: 2023-10-17
Payer: COMMERCIAL

## 2023-10-17 ENCOUNTER — HOSPITAL ENCOUNTER (OUTPATIENT)
Dept: CARDIOLOGY | Facility: HOSPITAL | Age: 33
Discharge: HOME | End: 2023-10-17
Attending: INTERNAL MEDICINE
Payer: COMMERCIAL

## 2023-10-17 VITALS
DIASTOLIC BLOOD PRESSURE: 70 MMHG | HEART RATE: 62 BPM | HEIGHT: 64 IN | WEIGHT: 117 LBS | BODY MASS INDEX: 19.97 KG/M2 | SYSTOLIC BLOOD PRESSURE: 112 MMHG

## 2023-10-17 DIAGNOSIS — G89.29 CHRONIC NONINTRACTABLE HEADACHE, UNSPECIFIED HEADACHE TYPE: ICD-10-CM

## 2023-10-17 DIAGNOSIS — R55 SYNCOPE, UNSPECIFIED SYNCOPE TYPE: ICD-10-CM

## 2023-10-17 DIAGNOSIS — R51.9 CHRONIC NONINTRACTABLE HEADACHE, UNSPECIFIED HEADACHE TYPE: ICD-10-CM

## 2023-10-17 DIAGNOSIS — Z72.0 TOBACCO USE: ICD-10-CM

## 2023-10-17 LAB
AORTIC ROOT ANNULUS: 2.59 CM
ASCENDING AORTA: 2.31 CM
AV INDEX (PROSTH): 0.67
AV MEAN GRADIENT: 4 MMHG
AV PEAK GRADIENT: 8 MMHG
AV VALVE AREA BY VELOCITY RATIO: 1.74 CM²
AV VALVE AREA: 1.59 CM²
AV VELOCITY RATIO: 0.73
BSA FOR ECHO PROCEDURE: 1.55 M2
CV ECHO LV RWT: 0.33 CM
DOP CALC AO PEAK VEL: 1.38 M/S
DOP CALC AO VTI: 29 CM
DOP CALC LVOT AREA: 2.4 CM2
DOP CALC LVOT DIAMETER: 1.74 CM
DOP CALC LVOT PEAK VEL: 1.01 M/S
DOP CALC LVOT STROKE VOLUME: 46.11 CM3
DOP CALC RVOT PEAK VEL: 0.75 M/S
DOP CALC RVOT VTI: 17.5 CM
DOP CALCLVOT PEAK VEL VTI: 19.4 CM
E WAVE DECELERATION TIME: 184.37 MSEC
E/A RATIO: 1.47
E/E' RATIO: 5.8 M/S
ECHO LV POSTERIOR WALL: 0.62 CM (ref 0.6–1.1)
FRACTIONAL SHORTENING: 29 % (ref 28–44)
INTERVENTRICULAR SEPTUM: 0.67 CM (ref 0.6–1.1)
IVRT: 82.78 MSEC
LA MAJOR: 3.42 CM
LA MINOR: 3.94 CM
LA WIDTH: 2.7 CM
LEFT ATRIUM SIZE: 2.31 CM
LEFT ATRIUM VOLUME INDEX MOD: 12.9 ML/M2
LEFT ATRIUM VOLUME INDEX: 12.4 ML/M2
LEFT ATRIUM VOLUME MOD: 20.15 CM3
LEFT ATRIUM VOLUME: 19.41 CM3
LEFT INTERNAL DIMENSION IN SYSTOLE: 2.66 CM (ref 2.1–4)
LEFT VENTRICLE DIASTOLIC VOLUME INDEX: 38.22 ML/M2
LEFT VENTRICLE DIASTOLIC VOLUME: 59.62 ML
LEFT VENTRICLE MASS INDEX: 40 G/M2
LEFT VENTRICLE SYSTOLIC VOLUME INDEX: 16.7 ML/M2
LEFT VENTRICLE SYSTOLIC VOLUME: 25.99 ML
LEFT VENTRICULAR INTERNAL DIMENSION IN DIASTOLE: 3.74 CM (ref 3.5–6)
LEFT VENTRICULAR MASS: 62.96 G
LV LATERAL E/E' RATIO: 5.44 M/S
LV SEPTAL E/E' RATIO: 6.21 M/S
LVOT MG: 2.2 MMHG
LVOT MV: 0.7 CM/S
MV PEAK A VEL: 0.59 M/S
MV PEAK E VEL: 0.87 M/S
MV STENOSIS PRESSURE HALF TIME: 53.47 MS
MV VALVE AREA P 1/2 METHOD: 4.11 CM2
PISA TR MAX VEL: 2.2 M/S
PULM VEIN S/D RATIO: 0.63
PV MEAN GRADIENT: 1 MMHG
PV PEAK D VEL: 0.68 M/S
PV PEAK GRADIENT: 4 MMHG
PV PEAK S VEL: 0.43 M/S
PV PEAK VELOCITY: 1.06 M/S
RA MAJOR: 3.31 CM
RA PRESSURE ESTIMATED: 3 MMHG
RA WIDTH: 2.57 CM
RIGHT VENTRICULAR END-DIASTOLIC DIMENSION: 2.44 CM
RV TB RVSP: 5 MMHG
SINUS: 2.15 CM
STJ: 2.05 CM
TDI LATERAL: 0.16 M/S
TDI SEPTAL: 0.14 M/S
TDI: 0.15 M/S
TR MAX PG: 19 MMHG
TV REST PULMONARY ARTERY PRESSURE: 22 MMHG
Z-SCORE OF LEFT VENTRICULAR DIMENSION IN END DIASTOLE: -1.85
Z-SCORE OF LEFT VENTRICULAR DIMENSION IN END SYSTOLE: -0.38

## 2023-10-17 PROCEDURE — 93248 EXT ECG>7D<15D REV&INTERPJ: CPT | Mod: ,,, | Performed by: INTERNAL MEDICINE

## 2023-10-17 PROCEDURE — 93248 CV CARDIAC MONITOR - 3-15 DAY ADULT (CUPID ONLY): ICD-10-PCS | Mod: ,,, | Performed by: INTERNAL MEDICINE

## 2023-10-17 PROCEDURE — 93306 TTE W/DOPPLER COMPLETE: CPT

## 2023-10-17 PROCEDURE — 93306 ECHO (CUPID ONLY): ICD-10-PCS | Mod: 26,,, | Performed by: INTERNAL MEDICINE

## 2023-10-17 PROCEDURE — 93306 TTE W/DOPPLER COMPLETE: CPT | Mod: 26,,, | Performed by: INTERNAL MEDICINE

## 2023-10-24 ENCOUNTER — OFFICE VISIT (OUTPATIENT)
Dept: NEUROLOGY | Facility: CLINIC | Age: 33
End: 2023-10-24
Payer: COMMERCIAL

## 2023-10-24 DIAGNOSIS — R90.89 ABNORMAL CT OF BRAIN: Primary | ICD-10-CM

## 2023-10-24 DIAGNOSIS — G43.909 MIGRAINE WITHOUT STATUS MIGRAINOSUS, NOT INTRACTABLE, UNSPECIFIED MIGRAINE TYPE: ICD-10-CM

## 2023-10-24 DIAGNOSIS — G44.40 MEDICATION OVERUSE HEADACHE: ICD-10-CM

## 2023-10-24 PROCEDURE — 99204 OFFICE O/P NEW MOD 45 MIN: CPT | Mod: 95,,, | Performed by: PSYCHIATRY & NEUROLOGY

## 2023-10-24 PROCEDURE — 99204 PR OFFICE/OUTPT VISIT, NEW, LEVL IV, 45-59 MIN: ICD-10-PCS | Mod: 95,,, | Performed by: PSYCHIATRY & NEUROLOGY

## 2023-10-24 PROCEDURE — 1159F MED LIST DOCD IN RCRD: CPT | Mod: CPTII,95,, | Performed by: PSYCHIATRY & NEUROLOGY

## 2023-10-24 PROCEDURE — 1159F PR MEDICATION LIST DOCUMENTED IN MEDICAL RECORD: ICD-10-PCS | Mod: CPTII,95,, | Performed by: PSYCHIATRY & NEUROLOGY

## 2023-10-24 PROCEDURE — 1160F PR REVIEW ALL MEDS BY PRESCRIBER/CLIN PHARMACIST DOCUMENTED: ICD-10-PCS | Mod: CPTII,95,, | Performed by: PSYCHIATRY & NEUROLOGY

## 2023-10-24 PROCEDURE — 1160F RVW MEDS BY RX/DR IN RCRD: CPT | Mod: CPTII,95,, | Performed by: PSYCHIATRY & NEUROLOGY

## 2023-10-24 RX ORDER — NAPROXEN 500 MG/1
500 TABLET ORAL 2 TIMES DAILY PRN
Qty: 20 TABLET | Refills: 2 | Status: SHIPPED | OUTPATIENT
Start: 2023-10-24

## 2023-10-24 NOTE — PROGRESS NOTES
Neurology Telemedicine Note     Name: Waldemar Mendoza  MRN: [id number]   [facility]: [id number]      Date: 10/24/2023    The patient location is: Home  The chief complaint leading to consultation is: headache  Visit type: Virtual visit with synchronous audio and video    TOTAL TIME SPENT: 40 mins    Each patient to whom I provide medical services by telemedicine is:  (1) informed of the relationship between the physician and patient and the respective role of any other health care provider with respect to management of the patient; and (2) notified that they may decline to receive medical services by telemedicine and may withdraw from such care at any time.    History of Present Illness (HPI): Patient is a 33 yrs old with history of migraines for the last 8-10 yrs who presents to tele-neurology clinic due to migraines. She went to a neurologist years ago in the past and had imaging with a CT scan. She was told she needs another scan with contrast. She never got it done. Headaches described as frontal and temporal at times and has a baseline headache always In the background. She takes tylenol and advil on a near daily basis. She had a CT scan done years ago which showed a calcification. Patient has migraine headaches with light and sound sensitivity approximately 1-2 times per month. No focal deficits or brainstem auras reported.     Nonmotor/Premotor ROS: as per HPI, and all other systems are negative    Past Medical History: The patient  has no past medical history on file.    Social History: The patient  reports that she has been smoking vaping with nicotine. She has never used smokeless tobacco. She reports current alcohol use of about 1.0 standard drink of alcohol per week. She reports current drug use. Frequency: 3.00 times per week. Drug: Marijuana.    Family History: Their family history includes Asthma in her brother and maternal grandmother; COPD in her maternal grandmother; Cancer in her brother, maternal uncle,  and mother; Diabetes in her maternal grandmother; Drug abuse in her maternal grandmother and mother; Early death in her mother; Thyroid cancer in her brother.    Allergies: Patient has no known allergies.     Meds:   Current Outpatient Medications on File Prior to Visit   Medication Sig Dispense Refill    ibuprofen (ADVIL,MOTRIN) 200 MG tablet Take 200 mg by mouth every 6 (six) hours as needed for Pain.       Current Facility-Administered Medications on File Prior to Visit   Medication Dose Route Frequency Provider Last Rate Last Admin    medroxyPROGESTERone (DEPO-PROVERA) injection 150 mg  150 mg Intramuscular Q90 Days Talia Godinez, NP   150 mg at 10/10/23 0856       Exam:  Vital Signs deferred with home visit    Constitutional  Well-developed, well-nourished, appears stated age   Eyes  No scleral icterus   ENT  Moist oral mucosa   Cardiovascular  No lower extremity edema    Respiratory  No labored breathing    Skin  No rashes   Hematologic  No bruising   Psychiatric  Normal mood and affect   Other  GI/ deferred    Neurological     * Mental status  Alert and oriented to person, place, time, and situation; no dysarthria; no aphasia; normal recent and remote memory; follows commands   * Cranial nerves     - CN II  Pupils midposition and symmetric   - CN III, IV, VI  Extraocular movements full, no nystagmus visualized   - CN V  Unable to test   - CN VII  Face strong and symmetric bilaterally    - CN VIII  Hearing grossly intact with conversation    - CN IX, X  Palate raises midline and symmetric    - CN XI  Strong shoulder shrug B    - CN XII  Tongue appears midline    * Motor  Normal bulk by appearance, no drift    * Sensory  Not tested objectively, no changes described by the patient   * Deep tendon reflexes  Not tested   * Coord/Movemt/Gait No hypophonic speech.  No facial masking.  No B bradykinesia.  No tremor with rest, posture, kinesis, or intention.   No chorea, athetosis, dystonia, myoclonus, or tics.    No motor impersistence.  Gait is deferred for safety.       Medical Record Review:  - Lab Results:  Hospital Outpatient Visit on 10/17/2023   Component Date Value Ref Range Status    BSA 10/17/2023 1.55  m2 Final    LVOT stroke volume 10/17/2023 46.11  cm3 Final    LVIDd 10/17/2023 3.74  3.5 - 6.0 cm Final    LV Systolic Volume 10/17/2023 25.99  mL Final    LV Systolic Volume Index 10/17/2023 16.7  mL/m2 Final    LVIDs 10/17/2023 2.66  2.1 - 4.0 cm Final    LV Diastolic Volume 10/17/2023 59.62  mL Final    LV Diastolic Volume Index 10/17/2023 38.22  mL/m2 Final    IVS 10/17/2023 0.67  0.6 - 1.1 cm Final    LVOT diameter 10/17/2023 1.74  cm Final    LVOT area 10/17/2023 2.4  cm2 Final    FS 10/17/2023 29  28 - 44 % Final    Left Ventricle Relative Wall Thick* 10/17/2023 0.33  cm Final    Posterior Wall 10/17/2023 0.62  0.6 - 1.1 cm Final    LV mass 10/17/2023 62.96  g Final    LV Mass Index 10/17/2023 40  g/m2 Final    MV Peak E [place] 10/17/2023 0.87  m/s Final    TDI LATERAL 10/17/2023 0.16  m/s Final    TDI SEPTAL 10/17/2023 0.14  m/s Final    E/E' ratio 10/17/2023 5.80  m/s Final    MV Peak A [place] 10/17/2023 0.59  m/s Final    TR Max [place] 10/17/2023 2.20  m/s Final    E/A ratio 10/17/2023 1.47   Final    IVRT 10/17/2023 82.78  msec Final    E wave deceleration time 10/17/2023 184.37  msec Final    LV SEPTAL E/E' RATIO 10/17/2023 6.21  m/s Final    LV LATERAL E/E' RATIO 10/17/2023 5.44  m/s Final    PV Peak S [place] 10/17/2023 0.43  m/s Final    PV Peak D [place] 10/17/2023 0.68  m/s Final    Pulm vein S/D ratio 10/17/2023 0.63   Final    LVOT peak dakotah 10/17/2023 1.01  m/s Final    Left Ventricular Outflow Tract Kiesha* 10/17/2023 0.70  cm/s Final    Left Ventricular Outflow Tract Kiesha* 10/17/2023 2.20  mmHg Final    LA size 10/17/2023 2.31  cm Final    Left Atrium Minor Axis 10/17/2023 3.94  cm Final    Left Atrium Major Axis 10/17/2023 3.42  cm Final    LA volume (mod) 10/17/2023 20.15  cm3 Final    LA Volume Index (Mod)  10/17/2023 12.9  mL/m2 Final    RVDD 10/17/2023 2.44  cm Final    RVOT peak VTI 10/17/2023 17.5  cm Final    RA Major Axis 10/17/2023 3.31  cm Final    RA Width 10/17/2023 2.57  cm Final    AV mean gradient 10/17/2023 4  mmHg Final    AV peak gradient 10/17/2023 8  mmHg Final    Ao peak brittney 10/17/2023 1.38  m/s Final    Ao VTI 10/17/2023 29.00  cm Final    LVOT peak VTI 10/17/2023 19.40  cm Final    AV valve area 10/17/2023 1.59  cm² Final    AV Velocity Ratio 10/17/2023 0.73   Final    AV index (prosthetic) 10/17/2023 0.67   Final    MEJIA by Velocity Ratio 10/17/2023 1.74  cm² Final    MV stenosis pressure 1/2 time 10/17/2023 53.47  ms Final    MV valve area p 1/2 method 10/17/2023 4.11  cm2 Final    Triscuspid Valve Regurgitation Pea* 10/17/2023 19  mmHg Final    PV mean gradient 10/17/2023 1  mmHg Final    PV PEAK VELOCITY 10/17/2023 1.06  m/s Final    PV peak gradient 10/17/2023 4  mmHg Final    RVOT peak brittney 10/17/2023 0.75  m/s Final    Ao root annulus 10/17/2023 2.59  cm Final    Sinus 10/17/2023 2.15  cm Final    STJ 10/17/2023 2.05  cm Final    Ascending aorta 10/17/2023 2.31  cm Final    Mean e' 10/17/2023 0.15  m/s Final    ZLVIDS 10/17/2023 -0.38   Final    ZLVIDD 10/17/2023 -1.85   Final    LA Volume Index 10/17/2023 12.4  mL/m2 Final    LA volume 10/17/2023 19.41  cm3 Final    LA WIDTH 10/17/2023 2.7  cm Final    Est. RA pres 10/17/2023 3  mmHg Final    RV TB RVSP 10/17/2023 5  mmHg Final    TV resting pulmonary artery pressu* 10/17/2023 22  mmHg Final   Lab Visit on 10/17/2023   Component Date Value Ref Range Status    Chlamydia, Amplified DNA 10/17/2023 Not Detected  Not Detected Final    N gonorrhoeae, amplified DNA 10/17/2023 Not Detected  Not Detected Final   Lab Visit on 10/10/2023   Component Date Value Ref Range Status    HIV 1/2 Ag/Ab 10/10/2023 Non-reactive  Non-reactive Final    RPR 10/10/2023 Non-reactive  Non-reactive Final    Hepatitis B Surface Ag 10/10/2023 Non-reactive  Non-reactive  Final    Hep B C IgM 10/10/2023 Non-reactive  Non-reactive Final    Hep A IgM 10/10/2023 Non-reactive  Non-reactive Final    Hepatitis C Ab 10/10/2023 Non-reactive  Non-reactive Final   Office Visit on 10/10/2023   Component Date Value Ref Range Status    Final Pathologic Diagnosis 10/10/2023    Final                    Value:Specimen Adequacy  Satisfactory for interpretation. Endocervical component is present.    [place] Category  Negative for intraepithelial lesion or malignancy.  Blood present.  Menstrual smear.  Sparsely cellular specimen.      Disclaimer 10/10/2023    Final                    Value:The Pap smear is a screening test that aids in the detection of cervical cancer and cancer precursors. Both false positive and false negative results can occur. The test should be used at regular intervals, and positive results should be confirmed before   definitive therapy.  This liquid based specimen is processed using the  or  Thin PrepPAP System. This specimen has been analyzed by the ThinPrep Imaging System ([employer]), an automated imaging and review system which assists the laboratory in evaluating   cells on ThinPrep PAP tests. Following automated imaging, selected fields from every slide are reviewed by a cytotechnologist and/or pathologist.     Screening was performed at [facility], [address] [place] [id number].      HPV other High Risk types, PCR 10/10/2023 Negative  Negative Final    HPV High Risk type 16, PCR 10/10/2023 Negative  Negative Final    HPV High Risk type 18, PCR 10/10/2023 Negative  Negative Final    POC Preg Test, Ur 10/10/2023 Negative  Negative Final     Acceptable 10/10/2023 Yes   Final   Lab Visit on 10/10/2023   Component Date Value Ref Range Status    Sodium 10/10/2023 139  136 - 145 mmol/L Final    Potassium 10/10/2023 3.7  3.5 - 5.1 mmol/L Final    Chloride 10/10/2023 105  95 - 110 mmol/L  Final    CO2 10/10/2023 24  23 - 29 mmol/L Final    Glucose 10/10/2023 91  70 - 110 mg/dL Final    BUN 10/10/2023 8  6 - 20 mg/dL Final    Creatinine 10/10/2023 0.9  0.5 - 1.4 mg/dL Final    Calcium 10/10/2023 9.7  8.7 - 10.5 mg/dL Final    Total Protein 10/10/2023 8.2  6.0 - 8.4 g/dL Final    Albumin 10/10/2023 4.7  3.5 - 5.2 g/dL Final    Total Bilirubin 10/10/2023 0.6  0.1 - 1.0 mg/dL Final    Alkaline Phosphatase 10/10/2023 64  55 - 135 U/L Final    AST 10/10/2023 18  10 - 40 U/L Final    ALT 10/10/2023 11  10 - 44 U/L Final    eGFR 10/10/2023 >60.0  >60 mL/min/1.73 m^2 Final    Anion Gap 10/10/2023 10  8 - 16 mmol/L Final    WBC 10/10/2023 8.09  3.90 - 12.70 K/uL Final    RBC 10/10/2023 4.64  4.00 - 5.40 M/uL Final    Hemoglobin 10/10/2023 13.7  12.0 - 16.0 g/dL Final    Hematocrit 10/10/2023 41.3  37.0 - 48.5 % Final    MCV 10/10/2023 89  82 - 98 fL Final    MCH 10/10/2023 29.5  27.0 - 31.0 pg Final    MCHC 10/10/2023 33.2  32.0 - 36.0 g/dL Final    RDW 10/10/2023 13.1  11.5 - 14.5 % Final    Platelets 10/10/2023 321  150 - 450 K/uL Final    MPV 10/10/2023 11.1  9.2 - 12.9 fL Final    Immature Granulocytes 10/10/2023 0.1  0.0 - 0.5 % Final    Gran # (ANC) 10/10/2023 4.5  1.8 - 7.7 K/uL Final    Immature Grans (Abs) 10/10/2023 0.01  0.00 - 0.04 K/uL Final    Lymph # 10/10/2023 3.0  1.0 - 4.8 K/uL Final    Mono # 10/10/2023 0.5  0.3 - 1.0 K/uL Final    Eos # 10/10/2023 0.1  0.0 - 0.5 K/uL Final    Baso # 10/10/2023 0.04  0.00 - 0.20 K/uL Final    nRBC 10/10/2023 0  0 /100 WBC Final    Gran % 10/10/2023 55.0  38.0 - 73.0 % Final    Lymph % 10/10/2023 36.5  18.0 - 48.0 % Final    Mono % 10/10/2023 6.7  4.0 - 15.0 % Final    Eosinophil % 10/10/2023 1.2  0.0 - 8.0 % Final    Basophil % 10/10/2023 0.5  0.0 - 1.9 % Final    Differential Method 10/10/2023 Automated   Final    Ferritin 10/10/2023 24  20.0 - 300.0 ng/mL Final    Iron 10/10/2023 190 (H)  30 - 160 ug/dL Final    Transferrin 10/10/2023 276  200 - 375 mg/dL  Final    TIBC 10/10/2023 408  250 - 450 ug/dL Final    Saturated Iron 10/10/2023 47  20 - 50 % Final    Vit D, 25-Hydroxy 10/10/2023 33  30 - 96 ng/mL Final    TSH 10/10/2023 2.739  0.400 - 4.000 uIU/mL Final    Free T4 10/10/2023 1.02  0.71 - 1.51 ng/dL Final    Cholesterol 10/10/2023 144  120 - 199 mg/dL Final    Triglycerides 10/10/2023 65  30 - 150 mg/dL Final    HDL 10/10/2023 53  40 - 75 mg/dL Final    LDL Cholesterol 10/10/2023 78.0  63.0 - 159.0 mg/dL Final    HDL/Cholesterol Ratio 10/10/2023 36.8  20.0 - 50.0 % Final    Total Cholesterol/HDL Ratio 10/10/2023 2.7  2.0 - 5.0 Final    Non-HDL Cholesterol 10/10/2023 91  mg/dL Final       Diagnoses:   1) Medication overuse headaches  2) Chronic migraines    Medical Decision Makin) Stop OTC meds  2) Naproxen 500 mg prn no more than 3 dose per week  3) b2 and mag ox 400 mg daily  4) headache diary  5) Avoid triggers, encourage hydration  6) MRI brain w/wo- given prior CT with calcification- meningioma?      I spent 40 minutes with the patient with >50% of the time spent with counseling and education regarding:    This is a consult performed through audio-visual using [employer] pebbles. Pt and provider are in different locations. History and physical exam are limited due to the nature of this encounter.       Natasha Todd MD

## 2023-11-03 LAB
OHS CV HOLTER SINUS AVERAGE HR: 96
OHS CV HOLTER SINUS MAX HR: 190
OHS CV HOLTER SINUS MIN HR: 56

## 2023-11-30 ENCOUNTER — OFFICE VISIT (OUTPATIENT)
Dept: INTERNAL MEDICINE | Facility: CLINIC | Age: 33
End: 2023-11-30
Payer: COMMERCIAL

## 2023-11-30 VITALS
WEIGHT: 120.56 LBS | SYSTOLIC BLOOD PRESSURE: 114 MMHG | OXYGEN SATURATION: 99 % | BODY MASS INDEX: 20.58 KG/M2 | HEART RATE: 100 BPM | RESPIRATION RATE: 18 BRPM | DIASTOLIC BLOOD PRESSURE: 72 MMHG | TEMPERATURE: 98 F | HEIGHT: 64 IN

## 2023-11-30 DIAGNOSIS — Z00.00 ROUTINE GENERAL MEDICAL EXAMINATION AT A HEALTH CARE FACILITY: Primary | ICD-10-CM

## 2023-11-30 DIAGNOSIS — R79.0 ABNORMAL SERUM IRON LEVEL: ICD-10-CM

## 2023-11-30 DIAGNOSIS — Z23 NEED FOR TDAP VACCINATION: ICD-10-CM

## 2023-11-30 DIAGNOSIS — Z23 NEED FOR VACCINATION: ICD-10-CM

## 2023-11-30 PROCEDURE — 90686 IIV4 VACC NO PRSV 0.5 ML IM: CPT | Mod: S$GLB,,, | Performed by: FAMILY MEDICINE

## 2023-11-30 PROCEDURE — 3078F PR MOST RECENT DIASTOLIC BLOOD PRESSURE < 80 MM HG: ICD-10-PCS | Mod: CPTII,S$GLB,, | Performed by: FAMILY MEDICINE

## 2023-11-30 PROCEDURE — 3074F PR MOST RECENT SYSTOLIC BLOOD PRESSURE < 130 MM HG: ICD-10-PCS | Mod: CPTII,S$GLB,, | Performed by: FAMILY MEDICINE

## 2023-11-30 PROCEDURE — 90472 IMMUNIZATION ADMIN EACH ADD: CPT | Mod: S$GLB,,, | Performed by: FAMILY MEDICINE

## 2023-11-30 PROCEDURE — 99395 PREV VISIT EST AGE 18-39: CPT | Mod: 25,S$GLB,, | Performed by: FAMILY MEDICINE

## 2023-11-30 PROCEDURE — 3008F PR BODY MASS INDEX (BMI) DOCUMENTED: ICD-10-PCS | Mod: CPTII,S$GLB,, | Performed by: FAMILY MEDICINE

## 2023-11-30 PROCEDURE — 3074F SYST BP LT 130 MM HG: CPT | Mod: CPTII,S$GLB,, | Performed by: FAMILY MEDICINE

## 2023-11-30 PROCEDURE — 99999 PR PBB SHADOW E&M-EST. PATIENT-LVL III: CPT | Mod: PBBFAC,,, | Performed by: FAMILY MEDICINE

## 2023-11-30 PROCEDURE — 99395 PR PREVENTIVE VISIT,EST,18-39: ICD-10-PCS | Mod: 25,S$GLB,, | Performed by: FAMILY MEDICINE

## 2023-11-30 PROCEDURE — 90715 TDAP VACCINE GREATER THAN OR EQUAL TO 7YO IM: ICD-10-PCS | Mod: S$GLB,,, | Performed by: FAMILY MEDICINE

## 2023-11-30 PROCEDURE — 90686 FLU VACCINE (QUAD) GREATER THAN OR EQUAL TO 3YO PRESERVATIVE FREE IM: ICD-10-PCS | Mod: S$GLB,,, | Performed by: FAMILY MEDICINE

## 2023-11-30 PROCEDURE — 90471 FLU VACCINE (QUAD) GREATER THAN OR EQUAL TO 3YO PRESERVATIVE FREE IM: ICD-10-PCS | Mod: S$GLB,,, | Performed by: FAMILY MEDICINE

## 2023-11-30 PROCEDURE — 99999 PR PBB SHADOW E&M-EST. PATIENT-LVL III: ICD-10-PCS | Mod: PBBFAC,,, | Performed by: FAMILY MEDICINE

## 2023-11-30 PROCEDURE — 1159F PR MEDICATION LIST DOCUMENTED IN MEDICAL RECORD: ICD-10-PCS | Mod: CPTII,S$GLB,, | Performed by: FAMILY MEDICINE

## 2023-11-30 PROCEDURE — 90472 TDAP VACCINE GREATER THAN OR EQUAL TO 7YO IM: ICD-10-PCS | Mod: S$GLB,,, | Performed by: FAMILY MEDICINE

## 2023-11-30 PROCEDURE — 90471 IMMUNIZATION ADMIN: CPT | Mod: S$GLB,,, | Performed by: FAMILY MEDICINE

## 2023-11-30 PROCEDURE — 3008F BODY MASS INDEX DOCD: CPT | Mod: CPTII,S$GLB,, | Performed by: FAMILY MEDICINE

## 2023-11-30 PROCEDURE — 1159F MED LIST DOCD IN RCRD: CPT | Mod: CPTII,S$GLB,, | Performed by: FAMILY MEDICINE

## 2023-11-30 PROCEDURE — 90715 TDAP VACCINE 7 YRS/> IM: CPT | Mod: S$GLB,,, | Performed by: FAMILY MEDICINE

## 2023-11-30 PROCEDURE — 3078F DIAST BP <80 MM HG: CPT | Mod: CPTII,S$GLB,, | Performed by: FAMILY MEDICINE

## 2023-11-30 NOTE — PROGRESS NOTES
Subjective:       Patient ID: Waldemar Mendoza is a 33 y.o. female.    Chief Complaint: Annual Exam    Patient presents to clinic today for annual physical exam.    Wt Readings from Last 10 Encounters:  11/30/23 : 54.7 kg (120 lb 9.5 oz)  10/17/23 : 53.1 kg (117 lb)  10/10/23 : 53.2 kg (117 lb 4.6 oz)  10/10/23 : 53.3 kg (117 lb 8.1 oz)  10/03/23 : 53.6 kg (118 lb 2.7 oz)  01/23/23 : 56.9 kg (125 lb 7.1 oz)  04/19/19 : 67.3 kg (148 lb 5.9 oz)        Review of Systems   Constitutional:  Negative for chills, fatigue, fever and unexpected weight change.   HENT:  Negative for congestion, dental problem, ear pain, hearing loss, rhinorrhea and trouble swallowing.    Eyes:  Negative for pain and visual disturbance.   Respiratory:  Negative for cough and shortness of breath.    Cardiovascular:  Negative for chest pain, palpitations and leg swelling.   Gastrointestinal:  Negative for abdominal distention, abdominal pain, blood in stool, constipation, diarrhea, nausea and vomiting.   Genitourinary:  Negative for difficulty urinating and vaginal discharge.   Musculoskeletal:  Negative for arthralgias and myalgias.   Skin:  Negative for rash.   Neurological:  Negative for dizziness, weakness, numbness and headaches.   Hematological:  Negative for adenopathy. Does not bruise/bleed easily.   Psychiatric/Behavioral:  Negative for dysphoric mood and sleep disturbance. The patient is not nervous/anxious.          Objective:      Physical Exam  Vitals reviewed.   Constitutional:       General: She is not in acute distress.     Appearance: Normal appearance. She is well-developed.   HENT:      Head: Normocephalic and atraumatic.      Right Ear: Tympanic membrane, ear canal and external ear normal.      Left Ear: Tympanic membrane, ear canal and external ear normal.      Nose: Nose normal. No mucosal edema or rhinorrhea.      Mouth/Throat:      Pharynx: Uvula midline.   Eyes:      General: Lids are normal. No scleral icterus.      Extraocular Movements: Extraocular movements intact.      Conjunctiva/sclera: Conjunctivae normal.      Pupils: Pupils are equal, round, and reactive to light.   Neck:      Thyroid: No thyromegaly.   Cardiovascular:      Rate and Rhythm: Normal rate and regular rhythm.      Heart sounds: No murmur heard.     No friction rub. No gallop.   Pulmonary:      Effort: Pulmonary effort is normal.      Breath sounds: Normal breath sounds. No wheezing, rhonchi or rales.   Abdominal:      General: Bowel sounds are normal. There is no distension.      Palpations: Abdomen is soft. There is no mass.      Tenderness: There is no abdominal tenderness.   Musculoskeletal:         General: Normal range of motion.      Cervical back: Normal range of motion and neck supple.   Lymphadenopathy:      Cervical: No cervical adenopathy.   Skin:     General: Skin is warm and dry.      Findings: No lesion or rash.      Nails: There is no clubbing.   Neurological:      Mental Status: She is alert and oriented to person, place, and time.      Cranial Nerves: No cranial nerve deficit.      Sensory: No sensory deficit.      Gait: Gait normal.   Psychiatric:         Mood and Affect: Mood and affect normal.         Assessment:       1. Routine general medical examination at a health care facility    2. Abnormal serum iron level    3. Need for Tdap vaccination    4. Need for vaccination        Plan:   1. Routine general medical examination at a health care facility    2. Abnormal serum iron level  Assessment & Plan:  Will reassess in 3 months; patient is not taking iron supplement     Orders:  -     CBC Auto Differential; Future; Expected date: 11/30/2023  -     Ferritin; Future; Expected date: 11/30/2023  -     Iron and TIBC; Future; Expected date: 11/30/2023    3. Need for Tdap vaccination  -     Tdap Vaccine    4. Need for vaccination  -     Influenza - Quadrivalent (PF)        Health Maintenance reviewed/updated.

## 2023-12-01 PROBLEM — R79.0 ABNORMAL SERUM IRON LEVEL: Status: ACTIVE | Noted: 2023-12-01

## 2024-01-02 ENCOUNTER — CLINICAL SUPPORT (OUTPATIENT)
Dept: OBSTETRICS AND GYNECOLOGY | Facility: CLINIC | Age: 34
End: 2024-01-02
Payer: COMMERCIAL

## 2024-01-02 ENCOUNTER — TELEPHONE (OUTPATIENT)
Dept: OBSTETRICS AND GYNECOLOGY | Facility: CLINIC | Age: 34
End: 2024-01-02
Payer: COMMERCIAL

## 2024-01-02 DIAGNOSIS — Z30.42 DEPOT CONTRACEPTION: Primary | ICD-10-CM

## 2024-01-02 PROCEDURE — 96372 THER/PROPH/DIAG INJ SC/IM: CPT | Mod: PBBFAC

## 2024-01-02 PROCEDURE — 96372 THER/PROPH/DIAG INJ SC/IM: CPT | Mod: S$GLB,,, | Performed by: NURSE PRACTITIONER

## 2024-01-02 PROCEDURE — 99212 OFFICE O/P EST SF 10 MIN: CPT | Mod: PBBFAC,25

## 2024-01-02 PROCEDURE — 99999 PR PBB SHADOW E&M-EST. PATIENT-LVL II: CPT | Mod: PBBFAC,,,

## 2024-01-02 RX ADMIN — MEDROXYPROGESTERONE ACETATE 150 MG: 150 INJECTION, SUSPENSION INTRAMUSCULAR at 01:01

## 2024-01-02 NOTE — PROGRESS NOTES
Depo Provera 150mg given IM without diffuculty for contraception.  Patient tolerated well.  Patient instructed to remain in clinic for 15 minutes following injection.  Appointment given for next injection.  Patient verbalized understanding of appointment and instructions.

## 2024-01-02 NOTE — TELEPHONE ENCOUNTER
----- Message from Arabella Patel sent at 1/2/2024  9:56 AM CST -----  Patient would like to reschedule the appointment to a different date. Call back number is [phone]. Thx.EL
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
## 2024-04-12 DIAGNOSIS — Z00.00 ROUTINE ADULT HEALTH MAINTENANCE: ICD-10-CM

## 2024-04-12 DIAGNOSIS — R55 SYNCOPE, UNSPECIFIED SYNCOPE TYPE: Primary | ICD-10-CM

## 2024-04-30 ENCOUNTER — OFFICE VISIT (OUTPATIENT)
Dept: CARDIOLOGY | Facility: CLINIC | Age: 34
End: 2024-04-30
Payer: COMMERCIAL

## 2024-04-30 ENCOUNTER — HOSPITAL ENCOUNTER (OUTPATIENT)
Dept: CARDIOLOGY | Facility: HOSPITAL | Age: 34
Discharge: HOME | End: 2024-04-30
Attending: INTERNAL MEDICINE
Payer: COMMERCIAL

## 2024-04-30 VITALS
HEART RATE: 96 BPM | DIASTOLIC BLOOD PRESSURE: 62 MMHG | BODY MASS INDEX: 22.25 KG/M2 | HEIGHT: 64 IN | SYSTOLIC BLOOD PRESSURE: 96 MMHG | WEIGHT: 130.31 LBS

## 2024-04-30 DIAGNOSIS — R55 SYNCOPE, UNSPECIFIED SYNCOPE TYPE: Primary | ICD-10-CM

## 2024-04-30 DIAGNOSIS — G89.29 CHRONIC NONINTRACTABLE HEADACHE, UNSPECIFIED HEADACHE TYPE: ICD-10-CM

## 2024-04-30 DIAGNOSIS — Z00.00 ROUTINE ADULT HEALTH MAINTENANCE: ICD-10-CM

## 2024-04-30 DIAGNOSIS — R51.9 CHRONIC NONINTRACTABLE HEADACHE, UNSPECIFIED HEADACHE TYPE: ICD-10-CM

## 2024-04-30 DIAGNOSIS — R55 SYNCOPE, UNSPECIFIED SYNCOPE TYPE: ICD-10-CM

## 2024-04-30 DIAGNOSIS — Z72.0 TOBACCO USE: ICD-10-CM

## 2024-04-30 LAB
OHS QRS DURATION: 76 MS
OHS QTC CALCULATION: 420 MS

## 2024-04-30 PROCEDURE — 3074F SYST BP LT 130 MM HG: CPT | Mod: CPTII,S$GLB,, | Performed by: INTERNAL MEDICINE

## 2024-04-30 PROCEDURE — 1159F MED LIST DOCD IN RCRD: CPT | Mod: CPTII,S$GLB,, | Performed by: INTERNAL MEDICINE

## 2024-04-30 PROCEDURE — 93005 ELECTROCARDIOGRAM TRACING: CPT

## 2024-04-30 PROCEDURE — 3008F BODY MASS INDEX DOCD: CPT | Mod: CPTII,S$GLB,, | Performed by: INTERNAL MEDICINE

## 2024-04-30 PROCEDURE — G2211 COMPLEX E/M VISIT ADD ON: HCPCS | Mod: S$GLB,,, | Performed by: INTERNAL MEDICINE

## 2024-04-30 PROCEDURE — 93010 ELECTROCARDIOGRAM REPORT: CPT | Mod: ,,, | Performed by: INTERNAL MEDICINE

## 2024-04-30 PROCEDURE — 99999 PR PBB SHADOW E&M-EST. PATIENT-LVL III: CPT | Mod: PBBFAC,,, | Performed by: INTERNAL MEDICINE

## 2024-04-30 PROCEDURE — 3078F DIAST BP <80 MM HG: CPT | Mod: CPTII,S$GLB,, | Performed by: INTERNAL MEDICINE

## 2024-04-30 PROCEDURE — 1160F RVW MEDS BY RX/DR IN RCRD: CPT | Mod: CPTII,S$GLB,, | Performed by: INTERNAL MEDICINE

## 2024-04-30 PROCEDURE — 99214 OFFICE O/P EST MOD 30 MIN: CPT | Mod: S$GLB,,, | Performed by: INTERNAL MEDICINE

## 2024-04-30 RX ORDER — MIDODRINE HYDROCHLORIDE 5 MG/1
5 TABLET ORAL 3 TIMES DAILY PRN
Qty: 180 TABLET | Refills: 1 | Status: SHIPPED | OUTPATIENT
Start: 2024-04-30 | End: 2024-05-13 | Stop reason: SDUPTHER

## 2024-04-30 NOTE — PROGRESS NOTES
Subjective:   Patient ID:  Waldemar Mendoza is a 33 y.o. female who presents for cardiac consult of No chief complaint on file.      Referral by: No referring provider defined for this encounter.     Reason for consult: syncope      HPI  The patient came in today for cardiac consult of No chief complaint on file.      Waldemar Mendoza is a 33 y.o. female pt with migraines presents for follow up CV eval    10/10/23  BP stable.  today. BMI 20 - 117 lbs.     She had first syncopal episode last year. She was laying in bed/watching TV - she started to have stomach pain  - she was sitting on toilet and had passed out, she had a prodrome of room darkening.   She can also feel it coming on now and tries to cool herself off.   She had a lot of weight loss - > 60 lbs. She is  at [employer] - works 10/hours day.   ECG - sinus tach V rate 105      4/30/24  BP low 90s/60s  HR 90s  ECHO 10/2023 with normal bi V function and valves.   Vital monitor 10/2023 with aVG HR 96, rare ectopy, neg for arrhythmias.       Patient is compliant with medications.    Results for orders placed during the hospital encounter of 10/17/23    Echo    Interpretation Summary    Left Ventricle: The left ventricle is normal in size. Ventricular mass is normal. Normal wall thickness. Normal wall motion. There is normal systolic function with a visually estimated ejection fraction of 55 - 60%. There is normal diastolic function.    Right Ventricle: Normal right ventricular cavity size. Wall thickness is normal. Right ventricle wall motion  is normal. Systolic function is normal.    Pulmonary Artery: The estimated pulmonary artery systolic pressure is 22 mmHg.    IVC/SVC: Normal venous pressure at 3 mmHg.      Cardiac Monitor - 3-15 Day Adult (Cupid Only)    Result Date: 11/3/2023    The predominant rhythm is sinus.    The patient was monitored for a total of 14d 2h, underlying rhythm is   Sinus.  The minimum heart rate was 56 bpm;  the maximum 190 bpm; the average 96   bpm.  0 % of Atrial fibrillation/Atrial flutter with longest episode of 0 ms.  The total burden of AV Block present was 0 % [Complete Heart Block: 0 %;   Advanced (High Grade):  0 %; 2nd Degree, Mobitz II: 0 %; 2nd Degree, Mobitz I: 0 %].  There were 0 pauses, the longest pause was 0 ms at --.  Total count of Ventricular Tachycardia (VT): 0 episode(s). Longest VT: 0 s   on --. Fastest VT: -- bpm on  --.  4 supraventricular episodes were found. Longest SVT Episode 6 beats,   Fastest  bpm  There were a total of 90366 PVCs with 2 morphologies and 0 couplets.   Overall PVC [place] at 1.72 %  There were a total of 0 Other Beats. There were 0 total number of paced   beats.  There were a total of 388 PSVCs with 1 morphologies and 4 couplets.   Overall PSVC [place] at  0.02 %  There is a total of 1 patient events.              No past medical history on file.    No past surgical history on file.    Social History     Tobacco Use    Smoking status: Every Day     Types: Vaping with nicotine    Smokeless tobacco: Never   Substance Use Topics    Alcohol use: Yes     Alcohol/week: 1.0 standard drink of alcohol     Types: 1 Drinks containing 0.5 oz of alcohol per week    Drug use: Yes     Frequency: 3.0 times per week     Types: Marijuana       Family History   Problem Relation Name Age of Onset    Asthma Maternal Grandmother Mario ku     COPD Maternal Grandmother Mario ku     Diabetes Maternal Grandmother Mario ku     Drug abuse Maternal Grandmother Mario ku     Cancer Mother Tiara sonja     Drug abuse Mother Tiara sonja     Early death Mother Tiara sonja     Asthma Brother Shane bess     Cancer Brother Shane bess     Thyroid cancer Brother Shane bess     Cancer Maternal Uncle Luís ku        Patient's Medications   New Prescriptions    MIDODRINE (PROAMATINE) 5 MG TAB    Take 1 tablet (5 mg total) by mouth 3 (three) times daily as needed (use if BP  "is below 100/70).   Previous Medications    IBUPROFEN (ADVIL,MOTRIN) 200 MG TABLET    Take 200 mg by mouth every 6 (six) hours as needed for Pain.    NAPROXEN (NAPROSYN) 500 MG TABLET    Take 1 tablet (500 mg total) by mouth 2 (two) times daily as needed (migraine onset).   Modified Medications    No medications on file   Discontinued Medications    No medications on file       Review of Systems   Constitutional: Negative.    HENT: Negative.     Eyes: Negative.    Respiratory: Negative.     Cardiovascular: Negative.    Gastrointestinal: Negative.    Genitourinary: Negative.    Musculoskeletal: Negative.    Skin: Negative.    Neurological:  Positive for dizziness and loss of consciousness.   Endo/Heme/Allergies: Negative.    Psychiatric/Behavioral: Negative.     All 12 systems otherwise negative.      Wt Readings from Last 3 Encounters:   04/30/24 59.1 kg (130 lb 4.7 oz)   11/30/23 54.7 kg (120 lb 9.5 oz)   10/17/23 53.1 kg (117 lb)     Temp Readings from Last 3 Encounters:   11/30/23 98.1 °F (36.7 °C) (Tympanic)   10/03/23 98.2 °F (36.8 °C) (Tympanic)   01/23/23 97.8 °F (36.6 °C) (Oral)     BP Readings from Last 3 Encounters:   04/30/24 96/62   11/30/23 114/72   10/17/23 112/70     Pulse Readings from Last 3 Encounters:   04/30/24 96   11/30/23 100   10/17/23 62       BP 96/62 (BP Location: Left arm, Patient Position: Sitting, BP Method: Medium (Manual))   Pulse 96   Ht 5' 4" (1.626 m)   Wt 59.1 kg (130 lb 4.7 oz)   BMI 22.36 kg/m²     Objective:   Physical Exam  Vitals and nursing note reviewed.   Constitutional:       General: She is not in acute distress.     Appearance: She is well-developed. She is not diaphoretic.   HENT:      Head: Normocephalic and atraumatic.      Nose: Nose normal.   Eyes:      General: No scleral icterus.     Conjunctiva/sclera: Conjunctivae normal.   Neck:      Thyroid: No thyromegaly.      Vascular: No JVD.   Cardiovascular:      Rate and Rhythm: Normal rate and regular rhythm.      " "Heart sounds: S1 normal and S2 normal. No murmur heard.     No friction rub. No gallop. No S3 or S4 sounds.   Pulmonary:      Effort: Pulmonary effort is normal. No respiratory distress.      Breath sounds: Normal breath sounds. No stridor. No wheezing or rales.   Chest:      Chest wall: No tenderness.   Abdominal:      General: Bowel sounds are normal. There is no distension.      Palpations: Abdomen is soft. There is no mass.      Tenderness: There is no abdominal tenderness. There is no rebound.   Genitourinary:     Comments: Deferred  Musculoskeletal:         General: No tenderness or deformity. Normal range of motion.      Cervical back: Normal range of motion and neck supple.   Lymphadenopathy:      Cervical: No cervical adenopathy.   Skin:     General: Skin is warm and dry.      Coloration: Skin is not pale.      Findings: No erythema or rash.   Neurological:      Mental Status: She is alert and oriented to person, place, and time.      Motor: No abnormal muscle tone.      Coordination: Coordination normal.   Psychiatric:         Behavior: Behavior normal.         Thought Content: Thought content normal.         Judgment: Judgment normal.         Lab Results   Component Value Date     10/10/2023    K 3.7 10/10/2023     10/10/2023    CO2 24 10/10/2023    BUN 8 10/10/2023    CREATININE 0.9 10/10/2023    GLU 91 10/10/2023    AST 18 10/10/2023    ALT 11 10/10/2023    ALBUMIN 4.7 10/10/2023    PROT 8.2 10/10/2023    BILITOT 0.6 10/10/2023    WBC 8.09 10/10/2023    HGB 13.7 10/10/2023    HCT 41.3 10/10/2023    MCV 89 10/10/2023     10/10/2023    TSH 2.739 10/10/2023    CHOL 144 10/10/2023    HDL 53 10/10/2023    LDLCALC 78.0 10/10/2023    TRIG 65 10/10/2023         No results found for: "BNP", "INR"       Assessment:      1. Syncope, unspecified syncope type    2. Chronic nonintractable headache, unspecified headache type    3. Tobacco use          Plan:     Syncope - likely vasovagal - BP low " today   - rec inc fluid intake/ Liquid IV packets   - has lost > 60 lbs   - rec compression stockings, inc salt intake   -ECHO 10/2023 with normal bi V function and valves.   - Vital monitor 10/2023 with aVG HR 96, rare ectopy, neg for arrhythmias.   - if recurrent rec ER eval   - start midodrine 5mg PRN BP below 100/70    2. Migraines  - cont tx - rec neuro eval if recurrent     3. Tobacco use  - rec cessation    Visit today included increased complexity associated with the care of the episodic problem syncope addressed and managing the longitudinal care of the patient due to the serious and/or complex managed problem(s) .       Thank you for allowing me to participate in this patient's care. Please do not hesitate to contact me with any questions or concerns. Consult note has been forwarded to the referral physician.

## 2024-05-13 RX ORDER — MIDODRINE HYDROCHLORIDE 5 MG/1
5 TABLET ORAL 3 TIMES DAILY PRN
Qty: 180 TABLET | Refills: 1 | Status: SHIPPED | OUTPATIENT
Start: 2024-05-13 | End: 2025-05-13

## 2024-05-21 ENCOUNTER — CLINICAL SUPPORT (OUTPATIENT)
Dept: OBSTETRICS AND GYNECOLOGY | Facility: CLINIC | Age: 34
End: 2024-05-21
Payer: COMMERCIAL

## 2024-05-21 DIAGNOSIS — Z30.013 INITIATION OF DEPO PROVERA: Primary | ICD-10-CM

## 2024-05-21 LAB
B-HCG UR QL: NEGATIVE
CTP QC/QA: YES

## 2024-05-21 PROCEDURE — 81025 URINE PREGNANCY TEST: CPT | Mod: S$GLB,,, | Performed by: NURSE PRACTITIONER

## 2024-05-21 PROCEDURE — 96372 THER/PROPH/DIAG INJ SC/IM: CPT | Mod: S$GLB,,, | Performed by: NURSE PRACTITIONER

## 2024-05-21 PROCEDURE — 99999 PR PBB SHADOW E&M-EST. PATIENT-LVL II: CPT | Mod: PBBFAC,,,

## 2024-05-21 RX ADMIN — MEDROXYPROGESTERONE ACETATE 150 MG: 150 INJECTION, SUSPENSION INTRAMUSCULAR at 08:05

## 2024-05-21 NOTE — PROGRESS NOTES
Verified patient with 2 patient identifiers. Allergies and medications reviewed.     Pt missed last depo inj. Per Talia Godinez NP ok to do UPT and give inj if neg.     Upt neg.    Depo Provera 150mg/ml given IM to right ventrogluteal using aseptic technique.   No discomfort noted. Patient tolerated well.     Next injection scheduled.    Patient advised to wait 15 minutes in lobby to monitor for reaction.   Patient verbalized understanding.

## 2024-08-28 ENCOUNTER — TELEPHONE (OUTPATIENT)
Dept: OBSTETRICS AND GYNECOLOGY | Facility: CLINIC | Age: 34
End: 2024-08-28
Payer: COMMERCIAL

## 2024-08-28 NOTE — TELEPHONE ENCOUNTER
Called pt confirmed pt identifiers informed pt depo window ended 8/20 upt is needed in office prior to receiving injection and must receive before Tuesday if not beta then depo following day pt voiced understanding of this scheduled pt for Friday 8/30 10:30 at [facility].

## 2024-08-28 NOTE — TELEPHONE ENCOUNTER
----- Message from aJlen Roblero NP sent at 8/28/2024 12:27 PM CDT -----  Regarding: RE: soon appt  Contact: Waldemar  Can receive before Tuesday with neg upt.  Otherwise needs beta.     Jalen  ----- Message -----  From: Davy Marie MA  Sent: 8/28/2024  11:10 AM CDT  To: Jalen Roblero NP  Subject: FW: soon appt                                    Her last injection was 5/21 window ended 8/20, does she need blood work and depo following day or upt in the office ?  ----- Message -----  From: Mindi Michaels  Sent: 8/28/2024  11:08 AM CDT  To: Jenna Lauren Clinical Staff  Subject: soon appt                                        .Type:  Sooner Apoointment Request    Caller is requesting a sooner appointment.  Caller declined first available appointment listed below.  Caller will not accept being placed on the waitlist and is requesting a message be sent to doctor.  Name of Caller: Waldemar  When is the first available appointment?  Symptoms:  Would the patient rather a call back or a response via [facility]?   Best Call Back Number:   [phone]  Additional Information:  need to reschedule depo

## 2024-08-30 ENCOUNTER — CLINICAL SUPPORT (OUTPATIENT)
Dept: OBSTETRICS AND GYNECOLOGY | Facility: CLINIC | Age: 34
End: 2024-08-30
Payer: COMMERCIAL

## 2024-08-30 DIAGNOSIS — Z30.42 ENCOUNTER FOR DEPO-PROVERA CONTRACEPTION: Primary | ICD-10-CM

## 2024-08-30 LAB
B-HCG UR QL: NEGATIVE
CTP QC/QA: YES

## 2024-08-30 PROCEDURE — 99999 PR PBB SHADOW E&M-EST. PATIENT-LVL II: CPT | Mod: PBBFAC,,,

## 2024-08-30 RX ADMIN — MEDROXYPROGESTERONE ACETATE 150 MG: 150 INJECTION, SUSPENSION INTRAMUSCULAR at 10:08

## 2024-08-30 NOTE — PROGRESS NOTES
Patient in clinic today for contraception.    Two pt identifiers identified   Patient notified to wait 15 minutes after recieiving injection, patient verbalized understanding.   150 mg depo provera  administered IM to patients left ventrogluteal.  Patient tolerated well.  Next injection scheduled.

## 2024-10-18 DIAGNOSIS — Z00.00 ROUTINE ADULT HEALTH MAINTENANCE: Primary | ICD-10-CM

## 2024-10-18 DIAGNOSIS — R55 SYNCOPE, UNSPECIFIED SYNCOPE TYPE: ICD-10-CM

## 2024-10-29 ENCOUNTER — HOSPITAL ENCOUNTER (OUTPATIENT)
Dept: CARDIOLOGY | Facility: HOSPITAL | Age: 34
Discharge: HOME | End: 2024-10-29
Attending: INTERNAL MEDICINE
Payer: COMMERCIAL

## 2024-10-29 ENCOUNTER — OFFICE VISIT (OUTPATIENT)
Dept: CARDIOLOGY | Facility: CLINIC | Age: 34
End: 2024-10-29
Payer: COMMERCIAL

## 2024-10-29 VITALS
SYSTOLIC BLOOD PRESSURE: 110 MMHG | OXYGEN SATURATION: 99 % | HEIGHT: 64 IN | DIASTOLIC BLOOD PRESSURE: 72 MMHG | WEIGHT: 131.81 LBS | HEART RATE: 57 BPM | BODY MASS INDEX: 22.5 KG/M2

## 2024-10-29 DIAGNOSIS — G43.909 MIGRAINE WITHOUT STATUS MIGRAINOSUS, NOT INTRACTABLE, UNSPECIFIED MIGRAINE TYPE: ICD-10-CM

## 2024-10-29 DIAGNOSIS — R55 SYNCOPE, UNSPECIFIED SYNCOPE TYPE: ICD-10-CM

## 2024-10-29 DIAGNOSIS — Z00.00 ROUTINE ADULT HEALTH MAINTENANCE: ICD-10-CM

## 2024-10-29 DIAGNOSIS — Z72.0 TOBACCO USE: ICD-10-CM

## 2024-10-29 DIAGNOSIS — R55 SYNCOPE, UNSPECIFIED SYNCOPE TYPE: Primary | ICD-10-CM

## 2024-10-29 DIAGNOSIS — R51.9 CHRONIC NONINTRACTABLE HEADACHE, UNSPECIFIED HEADACHE TYPE: ICD-10-CM

## 2024-10-29 DIAGNOSIS — G89.29 CHRONIC NONINTRACTABLE HEADACHE, UNSPECIFIED HEADACHE TYPE: ICD-10-CM

## 2024-10-29 LAB
OHS QRS DURATION: 88 MS
OHS QTC CALCULATION: 406 MS

## 2024-10-29 PROCEDURE — 99999 PR PBB SHADOW E&M-EST. PATIENT-LVL IV: CPT | Mod: PBBFAC,,, | Performed by: INTERNAL MEDICINE

## 2024-10-29 PROCEDURE — 3074F SYST BP LT 130 MM HG: CPT | Mod: CPTII,S$GLB,, | Performed by: INTERNAL MEDICINE

## 2024-10-29 PROCEDURE — 3078F DIAST BP <80 MM HG: CPT | Mod: CPTII,S$GLB,, | Performed by: INTERNAL MEDICINE

## 2024-10-29 PROCEDURE — 1159F MED LIST DOCD IN RCRD: CPT | Mod: CPTII,S$GLB,, | Performed by: INTERNAL MEDICINE

## 2024-10-29 PROCEDURE — 99214 OFFICE O/P EST MOD 30 MIN: CPT | Mod: S$GLB,,, | Performed by: INTERNAL MEDICINE

## 2024-10-29 PROCEDURE — 3008F BODY MASS INDEX DOCD: CPT | Mod: CPTII,S$GLB,, | Performed by: INTERNAL MEDICINE

## 2024-10-29 PROCEDURE — 1160F RVW MEDS BY RX/DR IN RCRD: CPT | Mod: CPTII,S$GLB,, | Performed by: INTERNAL MEDICINE

## 2024-10-29 PROCEDURE — G2211 COMPLEX E/M VISIT ADD ON: HCPCS | Mod: S$GLB,,, | Performed by: INTERNAL MEDICINE

## 2024-10-29 PROCEDURE — 93005 ELECTROCARDIOGRAM TRACING: CPT

## 2024-10-29 PROCEDURE — 93010 ELECTROCARDIOGRAM REPORT: CPT | Mod: ,,, | Performed by: INTERNAL MEDICINE

## 2024-11-05 ENCOUNTER — PATIENT MESSAGE (OUTPATIENT)
Dept: RESEARCH | Facility: HOSPITAL | Age: 34
End: 2024-11-05
Payer: COMMERCIAL

## 2024-11-06 ENCOUNTER — OFFICE VISIT (OUTPATIENT)
Dept: NEUROLOGY | Facility: CLINIC | Age: 34
End: 2024-11-06
Payer: COMMERCIAL

## 2024-11-06 VITALS
WEIGHT: 134.06 LBS | HEIGHT: 64 IN | BODY MASS INDEX: 22.89 KG/M2 | DIASTOLIC BLOOD PRESSURE: 79 MMHG | HEART RATE: 94 BPM | SYSTOLIC BLOOD PRESSURE: 113 MMHG

## 2024-11-06 DIAGNOSIS — G44.40 MEDICATION OVERUSE HEADACHE: ICD-10-CM

## 2024-11-06 DIAGNOSIS — R29.898 NECK TIGHTNESS: ICD-10-CM

## 2024-11-06 DIAGNOSIS — G43.019 INTRACTABLE MIGRAINE WITHOUT AURA AND WITHOUT STATUS MIGRAINOSUS: Primary | ICD-10-CM

## 2024-11-06 DIAGNOSIS — R90.89 ABNORMAL CT OF BRAIN: ICD-10-CM

## 2024-11-06 DIAGNOSIS — G44.229 CHRONIC TENSION-TYPE HEADACHE, NOT INTRACTABLE: ICD-10-CM

## 2024-11-06 DIAGNOSIS — Z72.0 NOT READY TO QUIT SMOKING: ICD-10-CM

## 2024-11-06 PROCEDURE — 1160F RVW MEDS BY RX/DR IN RCRD: CPT | Mod: CPTII,S$GLB,,

## 2024-11-06 PROCEDURE — 3078F DIAST BP <80 MM HG: CPT | Mod: CPTII,S$GLB,,

## 2024-11-06 PROCEDURE — 99999 PR PBB SHADOW E&M-EST. PATIENT-LVL V: CPT | Mod: PBBFAC,,,

## 2024-11-06 PROCEDURE — 1159F MED LIST DOCD IN RCRD: CPT | Mod: CPTII,S$GLB,,

## 2024-11-06 PROCEDURE — 3074F SYST BP LT 130 MM HG: CPT | Mod: CPTII,S$GLB,,

## 2024-11-06 PROCEDURE — 3008F BODY MASS INDEX DOCD: CPT | Mod: CPTII,S$GLB,,

## 2024-11-06 PROCEDURE — 99215 OFFICE O/P EST HI 40 MIN: CPT | Mod: S$GLB,,,

## 2024-11-06 RX ORDER — SUMATRIPTAN 50 MG/1
50 TABLET, FILM COATED ORAL DAILY PRN
Qty: 9 TABLET | Refills: 0 | Status: SHIPPED | OUTPATIENT
Start: 2024-11-06 | End: 2024-12-06

## 2024-11-06 RX ORDER — AMITRIPTYLINE HYDROCHLORIDE 10 MG/1
10 TABLET, FILM COATED ORAL NIGHTLY
Qty: 30 TABLET | Refills: 0 | Status: SHIPPED | OUTPATIENT
Start: 2024-11-06 | End: 2024-12-06

## 2024-11-06 NOTE — PROGRESS NOTES
"Subjective:       Patient ID: Waldemar Mendoza is a 34 y.o. female.      Chief Complaint: "Headaches"        HPI    HPI 34 Years old Female with PMHx of Migraines / Syncope / Fatigue and other medical conditions came for the evaluation and recommendation of "Headaches".      Referral: The patient was referred by Cardiologist for Migraine Evaluation and is accompanied by self.     Headache History:    Onset: Started 10 years ago, not worsening    Description: Headaches are throbbing-like, building up slowly towards the night and early morning. Headaches do wake them up from sleep. They are progressively worsening and interfering with daily activities.    Timing: Duration of 2 hours for regular headaches / Duration of 12 hours for Migraines.     Frequency: Intermittent, with daily headaches reported 30 times per month, and 2 per month are migraine-like.    Pain Severity: Increasing in severity, rated 2-8/10, causing significant morbidity.    Location: Mainly in the bilateral frontal and parietal lobes, occasionally affecting the right or left side. Patient reports it can also involve generalized lobes.     Family History: No family history of early dementia or Migraines.    Medications: OTC Tylenol and Advil     Worsening Factors: None / Denies physical and emotional stressors. No specific food triggers identified.    Alleviating Factors: Dark and quiet room    Associated Symptoms: light and sound sensitivity / Bilateral Neck tightness /     Pertinent Negative Symptoms: No associated neurological deficits, No bilateral eye pressure, no scalp sensitivity, nausea, vomiting, neck pain with radiation, ear ringing, dizziness, balance issues, acute thunderclap onset, double or blurry vision, focal or bilateral limb weakness, or extremity numbness and tingling.    Positional/Behavioral Factors: Headaches are not positional or postural, not worsened by movement and bending the head downward. Denies worsening with Valsalva " maneuver.    Triggers: None    Prodromal Symptoms: No visual aura, irritability, or urinary frequency.    Exclusions: No TMJ issues, head trauma, seizures, caffeine overuse, vertigo, blackouts, fever, chills, or significant memory loss. No history of strokes or falls.    Patient does currently vape - she is not interested in quitting at this time.     Patient reports a prior history of Syncope, but reports last spell was over 1 year ago.     Ophthalmological History: Last eye clinic appointment 2 years ago, with normal pressures, no papilledema, and no abnormalities reported.  Requesting an Ophthalmology referral for routine eye exam.    Sleep History: No symptoms of sleep apnea (e.g., snoring, gasping, frequent nighttime awakening, daytime fatigue).       Abortive therapies (tried and failed):     NAPROSYN 500 MG BID / ADVIL 200 mg Q6H PRN / FIORICET / mag ox 400 mg daily - not effective     Preventative therapies (tried and failed):      None    Will avoid Topamax - patient has a history of Kidney stones    Pregnancy and birth control: None         Review of Systems   Constitutional:  Negative for activity change, appetite change, chills, diaphoresis, fatigue, fever and unexpected weight change.   HENT:  Negative for congestion, dental problem, drooling, ear discharge, ear pain, facial swelling, hearing loss, mouth sores, nosebleeds, postnasal drip, rhinorrhea, sinus pressure, sinus pain, sneezing, sore throat, tinnitus, trouble swallowing and voice change.    Eyes:  Positive for photophobia. Negative for pain, discharge, redness, itching and visual disturbance.   Respiratory:  Negative for cough, chest tightness, shortness of breath and wheezing.    Cardiovascular:  Negative for chest pain, palpitations and leg swelling.   Gastrointestinal:  Negative for abdominal distention, abdominal pain, blood in stool, constipation, diarrhea, nausea and vomiting.   Endocrine: Negative for cold intolerance, heat intolerance,  polydipsia, polyphagia and polyuria.   Genitourinary:  Negative for decreased urine volume, difficulty urinating, dysuria, flank pain, frequency, hematuria, pelvic pain, urgency and vaginal discharge.   Musculoskeletal:  Negative for arthralgias, back pain, gait problem, joint swelling, myalgias, neck pain and neck stiffness.        Bilateral Neck tightness associated with Headaches.    Skin:  Negative for color change and rash.   Allergic/Immunologic: Negative for immunocompromised state.   Neurological:  Positive for headaches. Negative for dizziness, tremors, seizures, syncope, facial asymmetry, speech difficulty, weakness, light-headedness and numbness.   Hematological:  Negative for adenopathy. Does not bruise/bleed easily.   Psychiatric/Behavioral:  Negative for agitation, behavioral problems, confusion, decreased concentration, dysphoric mood, hallucinations, self-injury, sleep disturbance and suicidal ideas. The patient is not nervous/anxious and is not hyperactive.    All other systems reviewed and are negative.                Current Outpatient Medications:     ibuprofen (ADVIL,MOTRIN) 200 MG tablet, Take 200 mg by mouth every 6 (six) hours as needed for Pain. (Patient not taking: Reported on 10/29/2024), Disp: , Rfl:     midodrine (PROAMATINE) 5 MG Tab, Take 1 tablet (5 mg total) by mouth 3 (three) times daily as needed (use if BP is below 100/70). (Patient not taking: Reported on 10/29/2024), Disp: 180 tablet, Rfl: 1    naproxen (NAPROSYN) 500 MG tablet, Take 1 tablet (500 mg total) by mouth 2 (two) times daily as needed (migraine onset). (Patient not taking: Reported on 10/29/2024), Disp: 20 tablet, Rfl: 2    Current Facility-Administered Medications:     medroxyPROGESTERone (DEPO-PROVERA) injection 150 mg, 150 mg, Intramuscular, Q90 Days, Talia Godinez NP, 150 mg at 08/30/24 1037    No past medical history on file.    No past surgical history on file.    Social History     Socioeconomic History     Marital status: Single   Tobacco Use    Smoking status: Every Day     Types: Vaping with nicotine    Smokeless tobacco: Never   Substance and Sexual Activity    Alcohol use: Yes     Alcohol/week: 1.0 standard drink of alcohol     Types: 1 Drinks containing 0.5 oz of alcohol per week    Drug use: Yes     Frequency: 3.0 times per week     Types: Marijuana    Sexual activity: Yes     Partners: Female     Birth control/protection: Abstinence         Past/Current Medical/Surgical History, Past/Current Social History, Past/Current Family History and Past/Current Medications were reviewed in detail.    Objective:           VITAL SIGNS WERE REVIEWED      GENERAL APPEARANCE:     The patient looks comfortable.    BMI    No signs of respiratory distress.    Normal breathing pattern.    No dysmorphic features    Normal eye contact.       GENERAL MEDICAL EXAM:    HEENT:  Head is atraumatic normocephalic.     FUNDUSCOPIC (OPHTHALMOSCOPIC) EXAMINATION showed no disc edema (papilledema).      NECK: No JVD. No visible lesions or goiters.     CHEST-CARDIOPULMONARY: No cyanosis. No tachypnea. Normal respiratory effort.    [id number]: No jaundice. No stomas or lesions. No visible hernias. No catheters.     SKIN, HAIR, NAILS: No pathognomonic skin rash.No neurofibromatosis. No visible lesions.No stigmata of autoimmune disease. No clubbing.    LIMBS: No varicose veins. No visible swelling.    MUSCULOSKELETAL: No visible deformities.No visible lesions.             Neurological Exam  Mental Status  Awake, alert and oriented to person, place and time. Oriented to person, place, time and situation. Recent and remote memory are intact. At 5 minutes recalls 3 of 3 objects. Speech is normal. Language is fluent with no aphasia. Attention and concentration are normal. Fund of knowledge is appropriate for level of education. Apraxia absent.    Cranial Nerves  CN I: Sense of smell is normal.  CN II: Visual acuity is normal.  Visual fields full to confrontation. Right funduscopic exam: disc intact. Left funduscopic exam: disc intact.  CN III, IV, VI: Extraocular movements intact bilaterally. Normal lids and orbits bilaterally. Pupils equal round and reactive to light bilaterally.  CN V: Facial sensation is normal.  CN VII: Full and symmetric facial movement.  CN VIII: Hearing is normal.  CN IX, X: Palate elevates symmetrically. Normal gag reflex.  CN XI: Shoulder shrug strength is normal.  CN XII: Tongue midline without atrophy or fasciculations.    Motor  Normal muscle bulk throughout. No fasciculations present. Normal muscle tone. No abnormal involuntary movements. Strength is 5/5 throughout all four extremities.    Sensory  Sensation is intact to light touch, pinprick, vibration and proprioception in all four extremities.    Reflexes  Deep tendon reflexes are 2+ and symmetric in all four extremities.    Coordination  Right: Finger-to-nose normal. Rapid alternating movement normal. Heel-to-shin normal.Left: Finger-to-nose normal. Rapid alternating movement normal. Heel-to-shin normal.    Gait  Casual gait is normal including stance, stride, and arm swing.Normal toe walking. Normal heel walking. Normal tandem gait. Romberg is absent. Normal pull test. Able to rise from chair without using arms.        Lab Results   Component Value Date    WBC 8.09 10/10/2023    HGB 13.7 10/10/2023    HCT 41.3 10/10/2023    MCV 89 10/10/2023     10/10/2023       Sodium   Date Value Ref Range Status   10/10/2023 139 136 - 145 mmol/L Final     Potassium   Date Value Ref Range Status   10/10/2023 3.7 3.5 - 5.1 mmol/L Final     Chloride   Date Value Ref Range Status   10/10/2023 105 95 - 110 mmol/L Final     CO2   Date Value Ref Range Status   10/10/2023 24 23 - 29 mmol/L Final     Glucose   Date Value Ref Range Status   10/10/2023 91 70 - 110 mg/dL Final     BUN   Date Value Ref Range Status   10/10/2023 8 6 - 20 mg/dL Final     Creatinine   Date  "Value Ref Range Status   10/10/2023 0.9 0.5 - 1.4 mg/dL Final     Calcium   Date Value Ref Range Status   10/10/2023 9.7 8.7 - 10.5 mg/dL Final     Total Protein   Date Value Ref Range Status   10/10/2023 8.2 6.0 - 8.4 g/dL Final     Albumin   Date Value Ref Range Status   10/10/2023 4.7 3.5 - 5.2 g/dL Final     Total Bilirubin   Date Value Ref Range Status   10/10/2023 0.6 0.1 - 1.0 mg/dL Final     Comment:     For infants and newborns, interpretation of results should be based  on gestational age, weight and in agreement with clinical  observations.    Premature Infant recommended reference ranges:  Up to 24 hours.............<8.0 mg/dL  Up to 48 hours............<12.0 mg/dL  3-5 days..................<15.0 mg/dL  6-29 days.................<15.0 mg/dL       Alkaline Phosphatase   Date Value Ref Range Status   10/10/2023 64 55 - 135 U/L Final     AST   Date Value Ref Range Status   10/10/2023 18 10 - 40 U/L Final     ALT   Date Value Ref Range Status   10/10/2023 11 10 - 44 U/L Final     Anion Gap   Date Value Ref Range Status   10/10/2023 10 8 - 16 mmol/L Final       No results found for: "FHALIFGH42"    Lab Results   Component Value Date    TSH 2.739 10/10/2023    FREET4 1.02 10/10/2023       No results found in the last 24 hours.    No results found in the last 24 hours.    Reviewed the neuroimaging independently       Assessment:   34 Years old Female with PMH as above came for an evaluation of "Headaches".    Intractable migraine without aura and without status migrainosus     Chronic tension-type headache, not intractable     Neck tightness     Abnormal CT of brain     Medication overuse headache     Not ready to quit smoking     Plan:   Patient Neurological Assessment is non-focal.  Patient's history and Physical point to rule out chronic migraines and tension type headaches as well as meningioma according to head CT.       HEADACHE PLAN    -Limit OTC Tylenol / Advil to less than 3 times per week to prevent " rebound headaches.  Patient verbalized understanding.    -Patient encouraged to keep a headache diary.      -Order Brain MRI W WO Contrast to rule out structural abnormalities contributing to Headaches. Patient's Head CT revealed calcification - would like to rule out meningioma.   We will schedule follow up appointment after MRI brain has been completed.     -Order B1 / B-12 / FA / Homocysteine  / HIV / RPR / TSH / Free T-4 / ARISTEO Screen / ESR / CRP / CBC / CMP for Neurologic Evaluation.     -Order PT Referral for Headache Prevention Therapy / Neck therapy.    -Order ophthalmology referral for routine eye evaluation     -Start Elavil 10 mg PO QHS for headache prevention therapy. Side effects discussed. Patient verbalized understanding. Plan to check EKG at next follow up visit.     -Start Imitrex 50 mg PO Daily PRN for headache abortive therapy. Side effects discussed. Patient verbalized understanding.      -Vapes Daily for 5 years. Smoking cessation counseling was provided for 10 minutes. Patient was educated on harmful effects of smoking, benefits and roadblocks to quitting, triggers, and coping skills that include behavioral and pharmacological interventions. Discussed Nicotine Replacement options. Offered referral to Smoking Cessation Program.  Patient declines at this time.      LABORATORY EVALUATION    No pertinent labs drawn in 2024 according to patient's medical record.     Labs: (2023) hepatitis panel / RPR / HIV / lipid panel / TSH / free T4 / vitamin-D / CBC / CMP / hepatitis-C / -personally reviewed -non-significant abnormalities       RADIOLOGY EVALUATION     Personally reviewed head CT without contrast - done 2014 - Asymmetric calcifications of right tentorium          NEUROPHYSIOLOGY EVALUATION       PATHOLOGY EVALUATION        NEUROCOGNITIVE AND NEUROPSYCHOLOGY EVALUATION                      MIGRAINE, COMMON, WITHOUT AURA, EPISODIC, HIGH FREQUENCY         MANAGEMENT       HEADACHE DIARY      DISCUSSED THE THREE-FOLD MANAGEMENT OF MIGRAINE:      LIFESTYLE CHANGES:       Good sleep hygiene  Avoid general triggers like lack of sleep/too much sleep, prolonged sun exposure, excessive screen time and specific triggers based on you own diary   Minimize physical and emotional stress  Smoking avoidance and cessation  Limit caffeine drinks to 1-2 a day   Good hydration   Small frequent meals and avoid skipping meals   Moderate 30-minute-long aerobic exercises 3 times/week. Avoid strenuous exercise         ABORTIVE MEDICATIONS (ACUTE-RESCUE MEDICATIONS):     Should only be taken 2-3 times/week to avoid rebound and overuse headaches.    I-explained to the patient that pain meds especially triptans should NOT be taken daily to avoid Rebound Headache and Overuse Headache.    Take at the ONSET of the headache sumatriptan 100 mg PO  (or other Triptan) in combination with naproxen 500 mg PO or Ibuprofen 800 mg PO for headache without nausea or vomiting.  This regimen can be repeated only once in 24 hours after 2 hours.    Side effects of triptans were discussed and include rare cardiac and cerebral ischemia and cannot be used with migraine associate with focal neurological deficits (complicated migraine) in addition to drowsiness and potential impairment of driving ability. The patient verbalized understanding.    NSAIDs can cause peptic ulcers, renal insufficiency and may increase the risk of cardiovascular diseases.  SEs were discussed with the patient. The patient verbalized understanding.    Triptans have shown to be more effective than Gepatns with more SE/AE.      AVOID NARCOTICS (OPIATES)      1. No randomized controlled study shows pain-free results with opioids in the treatment of migraine.     2. The physiologic consequences of opioid use are adverse, occur quickly, and can be permanent. Decreased gray matter, release of calcitonin gene-related peptide, dynorphin, and pro-inflammatory peptides, and  activation of excitatory glutamate receptors are all associated with opioid exposure.     3. Opioids are pro-nociceptive, prevent reversal of migraine central sensitization, and interfere with triptan effectiveness.     4.Opioids precipitate bad clinical outcomes, especially transformation to daily headache.     5. They cause disease progression, comorbidity, and excessive health care consumption.           NEXT OPTIONS:    Triptans:  Rizatriptan (Maxalt).    Gepants: Nuretc (rimegepant)75 mg >Ubrelvy (ubrogepant) 100 mg    Ditans: Reyvow (lasmiditan) 100 mg (No driving due to sedation)    Fioricet without codeine with Reglan.      Prednisone with Reglan.      LAST RESORT:     DHE NS Trudhesa (Max 2 a week)     C/I: concomitant use of vasoconstrictors like Triptans, strong CY inhibitors such as HAART PIs (eg, ritonavir, nelfinavir, or indinavir) and Macrolides (eg, erythromycin or clarithromycin), CAD, PVD, Stroke/TIA and Uncontrolled HTN.  Serious SEs include Vasospasm and Fibrosis (chronic use).       IMPENDING STATUS: Prednisone and Vistaril.    STATUS MIGRAINOSUS: ED-Infusion for Status Protocol.        PREVENTATIVE (MORE ACCURATELY MIGRAINE REDUCTION) MEDICATIONS:           Since the patient's headache is very frequent a lengthy discussion about preventative medications was carried out.The patient understands that prevention means DECREASING frequency and severity and NOT elimination.The patient was made aware that any new medication can cause serious allergic reaction.The medication is considered failure only if a therapeutic dose reached and maintained for 6-8 weeks.        HELPFUL SUPPLEMENTS:     Helpful supplements include Co-Q 10, B2, Mg, Feverfew (Dolovent combination) and butterbur (Petadolex)        NEUROPHARMACOLOGY     NEXT OPTIONS:       Zonisamide/Zonegran (ZNS) 100-400 mg QHS is a good alternative to TPM in case of SE/AE.     Amitriptyline/Elavil (TCA) slow titration to 100-Age which can cause  sleepiness, dry eyes, dry mouth, urinary retention, and rarely cardiac arrhythmias    Propranolol/Inderal  (BB)slow titration to 80 mg BID which can cause low blood pressure, slow heart rate, erectile dysfunction, depression, airway obstruction and heart failure exacerbations. Cannot be used with migraine associate with focal neurological deficits.    Lamotrigine/Lamictal  (LTG)slow titration to 100 mg BID which can cause serious skin rash and rare cardiac arrhythmias. LTG is superior to other therapies for specifically reducing migraine aura.     ANTI-CGRP AGENTS: Qulipta (alogepant) 60 mg QD, Erenumab (Aimovig) 140 mg SQ Pen monthly (Reported cases of Constipation and BP elevation) , Galcanezumab (Emgality) 120 mg SQ Pen monthly after a loading dose of 240 mg  and Fremnezumab (Ajovy) (Ligand Blocker): 225 mg SQ monthly or 675 mg every 3 months     Botox 200 units every 3 months.         LAST RESORT OPTIONS:      Namenda 10 mg BID     Valproic acid/ Depakote         NEUROMODULATION     Cefaly, Relivion, Nerivio and GammaCore (VNS)               WOMEN IN CHILD BEARING PERIOD     All migraine medications are not safe during pregnancy and the patient was made aware of this fact. Any pregnancy should be planned, and medications should be stopped PRIOR to pregnancy planning. Folic acid 1 mg daily was recommended. However, hormonal birth control complicates the management of migraine and can exacerbate migraine. If possible, mechanical contraception should be a better option.         PREGNANCY ISSUES         We had a lengthy discussion about managing migraine in patients who are trying to get pregnant or pregnant.    First, I recommend FA 1 mg QD     PRN medications are not safe. The safest option is Reglan PRN and not to be taken more than 2-3 times/week. Fioricet PRN is an option.     Traditional preventative options are not safe including Botox. Cefaly and Relivion are considered safe, but insurance does not cover  it.        SCHOOL AND WORK ACCOMMODATIONS        Allow the patient to wear sunglasses or a cap and switch out fluorescent bulbs.    Allow the patient to arrive 5 minutes later and leave 5 minutes earlier to avoid noisy traffic.    Allow the patient to carry a water bottle and refill as needed.    Allow the patient to snack whenever is needed.    Allow the patient to decrease the computer brightness.    Allow the patient to take breaks as needed and extra time for assignments and deadlines.    Allow the patient to avoid strenuous activity as needed.       MEDICAL/SURGICAL COMORBIDITIES     All relevant medical comorbidities noted and managed by primary care physician and medical care team.          HEALTHY LIFESTYLE AND PREVENTATIVE CARE    The patient to adhere to the age-appropriate health maintenance guidelines including screening tests and vaccinations. The patient to adhere to  healthy lifestyle, optimal weight, exercise, healthy diet, good sleep hygiene and avoiding drugs including smoking, alcohol and recreational drugs.    I spent a total of 52 minutes on the day of the visit.This includes face to face time and non-face to face time preparing to see the patient (eg, review of tests), obtaining and/or reviewing separately obtained history, documenting clinical information in the electronic or other health record, independently interpreting results and communicating results to the patient/family/caregiver, or care coordinator.     Please do not hesitate to contact me with any updates, questions or concerns.    No follow-ups on file.    Michelle Johnson, MSN, FNP-C    General Neurology

## 2024-11-15 ENCOUNTER — LAB VISIT (OUTPATIENT)
Dept: LAB | Facility: HOSPITAL | Age: 34
End: 2024-11-15
Payer: COMMERCIAL

## 2024-11-15 ENCOUNTER — OFFICE VISIT (OUTPATIENT)
Dept: OBSTETRICS AND GYNECOLOGY | Facility: CLINIC | Age: 34
End: 2024-11-15
Payer: COMMERCIAL

## 2024-11-15 VITALS
SYSTOLIC BLOOD PRESSURE: 104 MMHG | HEIGHT: 64 IN | BODY MASS INDEX: 22.74 KG/M2 | WEIGHT: 133.19 LBS | DIASTOLIC BLOOD PRESSURE: 70 MMHG

## 2024-11-15 DIAGNOSIS — Z01.419 ROUTINE GYNECOLOGICAL EXAMINATION: Primary | ICD-10-CM

## 2024-11-15 DIAGNOSIS — Z30.42 DEPOT CONTRACEPTION: ICD-10-CM

## 2024-11-15 DIAGNOSIS — G43.019 INTRACTABLE MIGRAINE WITHOUT AURA AND WITHOUT STATUS MIGRAINOSUS: ICD-10-CM

## 2024-11-15 LAB
ALBUMIN SERPL BCP-MCNC: 4.3 G/DL (ref 3.5–5.2)
ALP SERPL-CCNC: 65 U/L (ref 40–150)
ALT SERPL W/O P-5'-P-CCNC: 10 U/L (ref 10–44)
ANION GAP SERPL CALC-SCNC: 7 MMOL/L (ref 8–16)
AST SERPL-CCNC: 17 U/L (ref 10–40)
BASOPHILS # BLD AUTO: 0.04 K/UL (ref 0–0.2)
BASOPHILS NFR BLD: 0.5 % (ref 0–1.9)
BILIRUB SERPL-MCNC: 0.4 MG/DL (ref 0.1–1)
BUN SERPL-MCNC: 10 MG/DL (ref 6–20)
CALCIUM SERPL-MCNC: 9.6 MG/DL (ref 8.7–10.5)
CHLORIDE SERPL-SCNC: 106 MMOL/L (ref 95–110)
CO2 SERPL-SCNC: 24 MMOL/L (ref 23–29)
CREAT SERPL-MCNC: 0.9 MG/DL (ref 0.5–1.4)
CRP SERPL-MCNC: 0.4 MG/L (ref 0–8.2)
DIFFERENTIAL METHOD BLD: NORMAL
EOSINOPHIL # BLD AUTO: 0.1 K/UL (ref 0–0.5)
EOSINOPHIL NFR BLD: 0.7 % (ref 0–8)
ERYTHROCYTE [DISTWIDTH] IN BLOOD BY AUTOMATED COUNT: 12.4 % (ref 11.5–14.5)
ERYTHROCYTE [SEDIMENTATION RATE] IN BLOOD BY WESTERGREN METHOD: 3 MM/HR (ref 0–20)
EST. GFR  (NO RACE VARIABLE): >60 ML/MIN/1.73 M^2
FOLATE SERPL-MCNC: 8.4 NG/ML (ref 4–24)
GLUCOSE SERPL-MCNC: 97 MG/DL (ref 70–110)
HCT VFR BLD AUTO: 40.5 % (ref 37–48.5)
HCYS SERPL-SCNC: 8.5 UMOL/L (ref 4–15.5)
HGB BLD-MCNC: 13.3 G/DL (ref 12–16)
HIV 1+2 AB+HIV1 P24 AG SERPL QL IA: NORMAL
IMM GRANULOCYTES # BLD AUTO: 0.01 K/UL (ref 0–0.04)
IMM GRANULOCYTES NFR BLD AUTO: 0.1 % (ref 0–0.5)
LYMPHOCYTES # BLD AUTO: 2.4 K/UL (ref 1–4.8)
LYMPHOCYTES NFR BLD: 33.3 % (ref 18–48)
MCH RBC QN AUTO: 28.7 PG (ref 27–31)
MCHC RBC AUTO-ENTMCNC: 32.8 G/DL (ref 32–36)
MCV RBC AUTO: 87 FL (ref 82–98)
MONOCYTES # BLD AUTO: 0.5 K/UL (ref 0.3–1)
MONOCYTES NFR BLD: 6.4 % (ref 4–15)
NEUTROPHILS # BLD AUTO: 4.3 K/UL (ref 1.8–7.7)
NEUTROPHILS NFR BLD: 59 % (ref 38–73)
NRBC BLD-RTO: 0 /100 WBC
PLATELET # BLD AUTO: 289 K/UL (ref 150–450)
PMV BLD AUTO: 10.6 FL (ref 9.2–12.9)
POTASSIUM SERPL-SCNC: 3.9 MMOL/L (ref 3.5–5.1)
PROT SERPL-MCNC: 7.7 G/DL (ref 6–8.4)
RBC # BLD AUTO: 4.64 M/UL (ref 4–5.4)
SODIUM SERPL-SCNC: 137 MMOL/L (ref 136–145)
T4 FREE SERPL-MCNC: 1.06 NG/DL (ref 0.71–1.51)
TREPONEMA PALLIDUM IGG+IGM AB [PRESENCE] IN SERUM OR PLASMA BY IMMUNOASSAY: NONREACTIVE
TSH SERPL DL<=0.005 MIU/L-ACNC: 0.8 UIU/ML (ref 0.4–4)
VIT B12 SERPL-MCNC: 471 PG/ML (ref 210–950)
WBC # BLD AUTO: 7.32 K/UL (ref 3.9–12.7)

## 2024-11-15 PROCEDURE — 85025 COMPLETE CBC W/AUTO DIFF WBC: CPT

## 2024-11-15 PROCEDURE — 86593 SYPHILIS TEST NON-TREP QUANT: CPT

## 2024-11-15 PROCEDURE — 84443 ASSAY THYROID STIM HORMONE: CPT

## 2024-11-15 PROCEDURE — 80053 COMPREHEN METABOLIC PANEL: CPT

## 2024-11-15 PROCEDURE — 86225 DNA ANTIBODY NATIVE: CPT

## 2024-11-15 PROCEDURE — 84439 ASSAY OF FREE THYROXINE: CPT

## 2024-11-15 PROCEDURE — 82746 ASSAY OF FOLIC ACID SERUM: CPT

## 2024-11-15 PROCEDURE — 86140 C-REACTIVE PROTEIN: CPT

## 2024-11-15 PROCEDURE — 86039 ANTINUCLEAR ANTIBODIES (ANA): CPT

## 2024-11-15 PROCEDURE — 36415 COLL VENOUS BLD VENIPUNCTURE: CPT

## 2024-11-15 PROCEDURE — 82607 VITAMIN B-12: CPT

## 2024-11-15 PROCEDURE — 84425 ASSAY OF VITAMIN B-1: CPT

## 2024-11-15 PROCEDURE — 87389 HIV-1 AG W/HIV-1&-2 AB AG IA: CPT

## 2024-11-15 PROCEDURE — 86038 ANTINUCLEAR ANTIBODIES: CPT

## 2024-11-15 PROCEDURE — 83090 ASSAY OF HOMOCYSTEINE: CPT

## 2024-11-15 PROCEDURE — 99999 PR PBB SHADOW E&M-EST. PATIENT-LVL III: CPT | Mod: PBBFAC,,, | Performed by: NURSE PRACTITIONER

## 2024-11-15 PROCEDURE — 85651 RBC SED RATE NONAUTOMATED: CPT

## 2024-11-15 RX ORDER — MEDROXYPROGESTERONE ACETATE 150 MG/ML
150 INJECTION, SUSPENSION INTRAMUSCULAR
Status: SHIPPED | OUTPATIENT
Start: 2024-11-15 | End: 2026-02-08

## 2024-11-15 RX ADMIN — MEDROXYPROGESTERONE ACETATE 150 MG: 150 INJECTION, SUSPENSION INTRAMUSCULAR at 01:11

## 2024-11-15 NOTE — PROGRESS NOTES
"  Subjective:       Patient ID: Waldemar Mendoza is a 34 y.o. female.    Chief Complaint:  Well Woman and Constipation      History of Present Illness  Desires to continue depo provera as method of birth control   Denies pelvic/vaginal complaints   Health Maintenance   Topic Date Due    TETANUS VACCINE  11/30/2033    Hepatitis C Screening  Completed    Lipid Panel  Completed     GYN & OB History  No LMP recorded.   Date of Last Pap: 10/13/2023 Negative HPV negative    OB History   No obstetric history on file.       Review of Systems  Review of Systems        Objective:   /70   Ht 5' 4" (1.626 m)   Wt 60.4 kg (133 lb 2.5 oz)   BMI 22.86 kg/m²    Physical Exam:   Constitutional: She is oriented to person, place, and time. She appears well-developed and well-nourished.                           Neurological: She is alert and oriented to person, place, and time.    Skin: Skin is warm and dry.    Psychiatric: She has a normal mood and affect. Her behavior is normal. Judgment and thought content normal.      Assessment:        1. Routine gynecological examination    2. Depot contraception                Plan:            Waldemar was seen today for well woman and constipation.    Diagnoses and all orders for this visit:    Routine gynecological examination    Depot contraception  -     medroxyPROGESTERone (DEPO-PROVERA) injection 150 mg    Return to clinic in one year for WWE     "

## 2024-11-18 LAB
ANA PATTERN 1: NORMAL
ANA SER QL IF: POSITIVE
ANA TITR SER IF: NORMAL {TITER}

## 2024-11-19 DIAGNOSIS — R76.8 POSITIVE ANA (ANTINUCLEAR ANTIBODY): Primary | ICD-10-CM

## 2024-11-19 NOTE — PROGRESS NOTES
Good-morning Mrs. Mendoza,     I wanted to inform you that your ARISTEO screen has returned positive.     This result may indicate inflammation in the body, which could be due to factors such as a viral or bacterial infection, or an autoimmune disorder.     It's important to note that this test is nonspecific.     To further evaluate this finding, I will refer you to a rheumatologist. You will receive a call to schedule an appointment with their office.     We will discuss results further at your next follow up visit.   Please let me know if you have any questions or concerns.  Have a great day!    VIOLETTA Martinez

## 2024-11-20 LAB
ANTI SM ANTIBODY: 0.08 RATIO (ref 0–0.99)
ANTI SM/RNP ANTIBODY: 0.08 RATIO (ref 0–0.99)
ANTI-SM INTERPRETATION: NEGATIVE
ANTI-SM/RNP INTERPRETATION: NEGATIVE
ANTI-SSA ANTIBODY: 0.07 RATIO (ref 0–0.99)
ANTI-SSA INTERPRETATION: NEGATIVE
ANTI-SSB ANTIBODY: 0.08 RATIO (ref 0–0.99)
ANTI-SSB INTERPRETATION: NEGATIVE
DSDNA AB SER-ACNC: NORMAL [IU]/ML

## 2024-11-22 LAB — VIT B1 BLD-MCNC: 65 UG/L (ref 38–122)

## 2024-12-06 ENCOUNTER — PATIENT MESSAGE (OUTPATIENT)
Dept: NEUROLOGY | Facility: CLINIC | Age: 34
End: 2024-12-06
Payer: COMMERCIAL

## 2024-12-06 ENCOUNTER — TELEPHONE (OUTPATIENT)
Dept: NEUROLOGY | Facility: CLINIC | Age: 34
End: 2024-12-06
Payer: COMMERCIAL

## 2024-12-06 NOTE — TELEPHONE ENCOUNTER
----- Message from Lj sent at 12/6/2024  9:47 AM CST -----  Good Morning,    The physical therapy department received your order to get the attached patient scheduled for an evaluation. We have reached out to the patient and scheduled him/her for an evaluation; however, they have cancelled their appointment.We have made several attempts to get the patient rescheduled but have been unsuccessful.     We wanted you to be aware that your patient has not started therapy. If you speak with them again about starting therapy please have them reach out to us at [phone] to get scheduled?.      Sincerely,  Lj Taylor

## 2025-01-07 ENCOUNTER — OFFICE VISIT (OUTPATIENT)
Dept: INTERNAL MEDICINE | Facility: CLINIC | Age: 35
End: 2025-01-07
Payer: COMMERCIAL

## 2025-01-07 ENCOUNTER — LAB VISIT (OUTPATIENT)
Dept: LAB | Facility: HOSPITAL | Age: 35
End: 2025-01-07
Attending: FAMILY MEDICINE
Payer: COMMERCIAL

## 2025-01-07 VITALS
WEIGHT: 131.75 LBS | HEIGHT: 64 IN | OXYGEN SATURATION: 98 % | SYSTOLIC BLOOD PRESSURE: 110 MMHG | DIASTOLIC BLOOD PRESSURE: 60 MMHG | BODY MASS INDEX: 22.49 KG/M2 | HEART RATE: 88 BPM

## 2025-01-07 DIAGNOSIS — R79.0 ABNORMAL SERUM IRON LEVEL: ICD-10-CM

## 2025-01-07 DIAGNOSIS — F17.290 VAPING NICOTINE DEPENDENCE, TOBACCO PRODUCT: ICD-10-CM

## 2025-01-07 DIAGNOSIS — Z13.29 THYROID DISORDER SCREEN: ICD-10-CM

## 2025-01-07 DIAGNOSIS — Z13.220 SCREENING FOR LIPOID DISORDERS: ICD-10-CM

## 2025-01-07 DIAGNOSIS — G43.019 INTRACTABLE MIGRAINE WITHOUT AURA AND WITHOUT STATUS MIGRAINOSUS: ICD-10-CM

## 2025-01-07 DIAGNOSIS — Z00.00 ROUTINE GENERAL MEDICAL EXAMINATION AT A HEALTH CARE FACILITY: Primary | ICD-10-CM

## 2025-01-07 LAB
FERRITIN SERPL-MCNC: 42 NG/ML (ref 20–300)
IRON SERPL-MCNC: 62 UG/DL (ref 30–160)
SATURATED IRON: 18 % (ref 20–50)
TOTAL IRON BINDING CAPACITY: 337 UG/DL (ref 250–450)
TRANSFERRIN SERPL-MCNC: 228 MG/DL (ref 200–375)

## 2025-01-07 PROCEDURE — 1160F RVW MEDS BY RX/DR IN RCRD: CPT | Mod: CPTII,S$GLB,, | Performed by: FAMILY MEDICINE

## 2025-01-07 PROCEDURE — 1159F MED LIST DOCD IN RCRD: CPT | Mod: CPTII,S$GLB,, | Performed by: FAMILY MEDICINE

## 2025-01-07 PROCEDURE — 99395 PREV VISIT EST AGE 18-39: CPT | Mod: 25,S$GLB,, | Performed by: FAMILY MEDICINE

## 2025-01-07 PROCEDURE — 3008F BODY MASS INDEX DOCD: CPT | Mod: CPTII,S$GLB,, | Performed by: FAMILY MEDICINE

## 2025-01-07 PROCEDURE — 36415 COLL VENOUS BLD VENIPUNCTURE: CPT | Performed by: FAMILY MEDICINE

## 2025-01-07 PROCEDURE — 99999 PR PBB SHADOW E&M-EST. PATIENT-LVL IV: CPT | Mod: PBBFAC,,, | Performed by: FAMILY MEDICINE

## 2025-01-07 PROCEDURE — 82728 ASSAY OF FERRITIN: CPT | Performed by: FAMILY MEDICINE

## 2025-01-07 PROCEDURE — 83540 ASSAY OF IRON: CPT | Performed by: FAMILY MEDICINE

## 2025-01-07 PROCEDURE — 3074F SYST BP LT 130 MM HG: CPT | Mod: CPTII,S$GLB,, | Performed by: FAMILY MEDICINE

## 2025-01-07 PROCEDURE — 99406 BEHAV CHNG SMOKING 3-10 MIN: CPT | Mod: 25,S$GLB,, | Performed by: FAMILY MEDICINE

## 2025-01-07 PROCEDURE — 3078F DIAST BP <80 MM HG: CPT | Mod: CPTII,S$GLB,, | Performed by: FAMILY MEDICINE

## 2025-01-07 RX ORDER — AMITRIPTYLINE HYDROCHLORIDE 10 MG/1
10 TABLET, FILM COATED ORAL NIGHTLY
Qty: 30 TABLET | Refills: 0 | Status: SHIPPED | OUTPATIENT
Start: 2025-01-07 | End: 2025-02-06

## 2025-01-07 NOTE — PROGRESS NOTES
"Subjective:       Patient ID: Waldemar Mendoza is a 34 y.o. female.    Chief Complaint: Annual Exam    History of Present Illness    Patient presents to clinic today for annual physical exam.  - Patient reports significant improvement since her last visit 1 year ago in November 2023. She underwent lab work with neurology in November and is currently on a new medication prescribed by them, inquiring about obtaining a refill. Her iron levels were elevated about a year ago, but she was not taking iron supplements at that time. Recent blood count results from November were normal. Patient continues to vape but intends to quit in the near future. She denies having any current health problems or concerns, and has not received a flu vaccine or COVID-19 vaccine. She declines all vaccines today.    ROS:  General: -fever, -chills, -fatigue, -weight gain, -weight loss  Eyes: -eye pain, -vision change  ENMT: -hearing loss, -ear pain, -nasal congestion, -rhinorrhea, -dental problem, -trouble swallowing  Cardiovascular: -chest pain, -palpitations, -lower extremity edema  Respiratory: -cough, -trouble breathing  Gastrointestinal: -abdominal pain, -abdominal swelling, -nausea, -vomiting, -diarrhea, -constipation, -blood in stool  Genitourinary: -difficulty urinating, -genital problem  Musculoskeletal: -joint pain, -muscle aches  Integumentary: -rash  Lymphatics: -swollen glands, -easy bruising  Neurologic: -headache, -dizziness, -numbness, -weakness  Psychiatric: -anxiety, -depression, -sleep difficulty     Objective:     Vitals:    01/07/25 0734   BP: 110/60   BP Location: Right arm   Patient Position: Sitting   Pulse: 88   SpO2: 98%   Weight: 59.8 kg (131 lb 11.6 oz)   Height: 5' 4" (1.626 m)      Physical Exam  Vitals reviewed.   Constitutional:       General: She is not in acute distress.     Appearance: Normal appearance. She is well-developed.   HENT:      Head: Normocephalic and atraumatic.      Right Ear: Tympanic " membrane, ear canal and external ear normal.      Left Ear: Tympanic membrane, ear canal and external ear normal.      Nose: Nose normal. No mucosal edema or rhinorrhea.      Mouth/Throat:      Pharynx: Uvula midline.   Eyes:      General: Lids are normal. No scleral icterus.     Extraocular Movements: Extraocular movements intact.      Conjunctiva/sclera: Conjunctivae normal.      Pupils: Pupils are equal, round, and reactive to light.   Neck:      Thyroid: No thyromegaly.   Cardiovascular:      Rate and Rhythm: Normal rate and regular rhythm.      Heart sounds: No murmur heard.     No friction rub. No gallop.   Pulmonary:      Effort: Pulmonary effort is normal.      Breath sounds: Normal breath sounds. No wheezing, rhonchi or rales.   Abdominal:      General: Bowel sounds are normal. There is no distension.      Palpations: Abdomen is soft. There is no mass.      Tenderness: There is no abdominal tenderness.   Musculoskeletal:         General: Normal range of motion.      Cervical back: Normal range of motion and neck supple.   Lymphadenopathy:      Cervical: No cervical adenopathy.   Skin:     General: Skin is warm and dry.      Findings: No lesion or rash.      Nails: There is no clubbing.   Neurological:      Mental Status: She is alert and oriented to person, place, and time.      Cranial Nerves: No cranial nerve deficit.      Sensory: No sensory deficit.      Gait: Gait normal.   Psychiatric:         Mood and Affect: Mood and affect normal.           Lab Results   Component Value Date     11/15/2024    K 3.9 11/15/2024     11/15/2024    CO2 24 11/15/2024    GLU 97 11/15/2024    BUN 10 11/15/2024    CREATININE 0.9 11/15/2024    CALCIUM 9.6 11/15/2024    PROT 7.7 11/15/2024    ALBUMIN 4.3 11/15/2024    BILITOT 0.4 11/15/2024    ALKPHOS 65 11/15/2024    AST 17 11/15/2024    ALT 10 11/15/2024    EGFRNORACEVR >60.0 11/15/2024    ANIONGAP 7 (L) 11/15/2024       Lab Results   Component Value Date    WBC  7.32 11/15/2024    RBC 4.64 11/15/2024    HGB 13.3 11/15/2024    HCT 40.5 11/15/2024    MCV 87 11/15/2024     11/15/2024    GRAN 4.3 11/15/2024    GRAN 59.0 11/15/2024    LYMPH 2.4 11/15/2024    LYMPH 33.3 11/15/2024    MONO 0.5 11/15/2024    MONO 6.4 11/15/2024    EOSINOPHIL 0.7 11/15/2024    BASOPHIL 0.5 11/15/2024       Lab Results   Component Value Date    TSH 0.795 11/15/2024    FREET4 1.06 11/15/2024       Assessment:       1. Routine general medical examination at a health care facility    2. Vaping nicotine dependence, tobacco product    3. Screening for lipoid disorders    4. Thyroid disorder screen    5. Abnormal serum iron level        Plan:   1. Routine general medical examination at a health care facility  -     CBC Auto Differential; Future; Expected date: 01/07/2026  -     Comprehensive Metabolic Panel; Future; Expected date: 01/07/2026    2. Vaping nicotine dependence, tobacco product  -     Ambulatory referral/consult to Smoking Cessation Program; Future; Expected date: 01/14/2025    3. Screening for lipoid disorders  -     Lipid Panel; Future; Expected date: 01/07/2026    4. Thyroid disorder screen  -     TSH; Future; Expected date: 01/07/2026    5. Abnormal serum iron level  -     Ferritin; Future; Expected date: 01/07/2025  -     Iron and TIBC; Future; Expected date: 01/07/2025    VAPING CESSATION:   Noted the patient's intention to quit vaping in 2025.   Referred the patient to a free smoking cessation program.   Discussed the benefits of smoking cessation for overall health improvement.   Referred the patient to a smoking cessation program.   Instructed the patient to contact the office if they do not hear from the smoking cessation team.   Patient to quit vaping in 2025.  Discussed smoking cessation for 3 minutes.     Instructed the patient to contact neurology care team through patient portal or by phone for medication refills.       Patient expressed understanding and agreement with  plan.    Health Maintenance reviewed/updated.    Follow up in about 1 year (around 1/7/2026), or if symptoms worsen or fail to improve, for annual with Bridgette ANDREWS, labs today, labs PTA, **LOOK AT DATES ON LAB ORDERS**.    This note was generated with the assistance of ambient listening technology. Verbal consent was obtained by the patient and accompanying visitor(s) for the recording of patient appointment to facilitate this note. I attest to having reviewed and edited the generated note for accuracy, though some syntax or spelling errors may persist. Please contact the author of this note for any clarification.

## 2025-01-17 ENCOUNTER — PATIENT MESSAGE (OUTPATIENT)
Dept: INTERNAL MEDICINE | Facility: CLINIC | Age: 35
End: 2025-01-17
Payer: COMMERCIAL

## 2025-02-04 ENCOUNTER — CLINICAL SUPPORT (OUTPATIENT)
Dept: OBSTETRICS AND GYNECOLOGY | Facility: CLINIC | Age: 35
End: 2025-02-04
Payer: COMMERCIAL

## 2025-02-04 DIAGNOSIS — Z30.42 ENCOUNTER FOR DEPO-PROVERA CONTRACEPTION: Primary | ICD-10-CM

## 2025-02-04 PROCEDURE — 99999 PR PBB SHADOW E&M-EST. PATIENT-LVL I: CPT | Mod: PBBFAC,,,

## 2025-02-04 PROCEDURE — 96372 THER/PROPH/DIAG INJ SC/IM: CPT | Mod: S$GLB,,, | Performed by: NURSE PRACTITIONER

## 2025-02-04 RX ADMIN — MEDROXYPROGESTERONE ACETATE 150 MG: 150 INJECTION, SUSPENSION INTRAMUSCULAR at 11:02

## 2025-03-13 ENCOUNTER — PATIENT MESSAGE (OUTPATIENT)
Dept: RHEUMATOLOGY | Facility: CLINIC | Age: 35
End: 2025-03-13
Payer: COMMERCIAL

## 2025-04-08 ENCOUNTER — OFFICE VISIT (OUTPATIENT)
Dept: INTERNAL MEDICINE | Facility: CLINIC | Age: 35
End: 2025-04-08
Payer: COMMERCIAL

## 2025-04-08 VITALS
HEART RATE: 107 BPM | BODY MASS INDEX: 23.17 KG/M2 | DIASTOLIC BLOOD PRESSURE: 68 MMHG | OXYGEN SATURATION: 98 % | WEIGHT: 135.69 LBS | HEIGHT: 64 IN | SYSTOLIC BLOOD PRESSURE: 110 MMHG

## 2025-04-08 DIAGNOSIS — R59.0 POSTAURICULAR LYMPHADENOPATHY: Primary | ICD-10-CM

## 2025-04-08 PROCEDURE — 3008F BODY MASS INDEX DOCD: CPT | Mod: CPTII,S$GLB,, | Performed by: FAMILY MEDICINE

## 2025-04-08 PROCEDURE — 3078F DIAST BP <80 MM HG: CPT | Mod: CPTII,S$GLB,, | Performed by: FAMILY MEDICINE

## 2025-04-08 PROCEDURE — 1160F RVW MEDS BY RX/DR IN RCRD: CPT | Mod: CPTII,S$GLB,, | Performed by: FAMILY MEDICINE

## 2025-04-08 PROCEDURE — 99214 OFFICE O/P EST MOD 30 MIN: CPT | Mod: S$GLB,,, | Performed by: FAMILY MEDICINE

## 2025-04-08 PROCEDURE — 99999 PR PBB SHADOW E&M-EST. PATIENT-LVL III: CPT | Mod: PBBFAC,,, | Performed by: FAMILY MEDICINE

## 2025-04-08 PROCEDURE — 1159F MED LIST DOCD IN RCRD: CPT | Mod: CPTII,S$GLB,, | Performed by: FAMILY MEDICINE

## 2025-04-08 PROCEDURE — 3074F SYST BP LT 130 MM HG: CPT | Mod: CPTII,S$GLB,, | Performed by: FAMILY MEDICINE

## 2025-04-08 RX ORDER — CEPHALEXIN 500 MG/1
500 CAPSULE ORAL EVERY 6 HOURS
Qty: 28 CAPSULE | Refills: 0 | Status: SHIPPED | OUTPATIENT
Start: 2025-04-08 | End: 2025-04-15

## 2025-04-08 NOTE — PROGRESS NOTES
"Subjective:       Patient ID: Waldemar Mendoza is a 34 y.o. female.    CHIEF COMPLAINT:  - Patient presents with a lump behind the right ear that has recently become larger and tender.    HPI:  - Patient reports an intermittent lump behind the right ear. Approximately 1 week ago, she noticed the lump had become larger and tender to touch, describing it as feeling bruised with palpation. She is unsure of the exact duration but indicates it fluctuates in presence. The recent changes in size and tenderness prompted her to make this appointment. The lump has not been painful prior to the recent change.  - She denies fever, chills, ear pain, and recent cuts or scrapes on the scalp.      Review of Systems   Constitutional:  Negative for chills, fatigue, fever and unexpected weight change.   HENT:  Negative for ear pain.    Eyes:  Negative for visual disturbance.   Respiratory:  Negative for shortness of breath.    Cardiovascular:  Negative for chest pain.   Musculoskeletal:  Negative for myalgias.   Neurological:  Negative for headaches.   Hematological:  Positive for adenopathy.         Objective:     Vitals:    04/08/25 1134   BP: 110/68   Pulse: 107   SpO2: 98%   Weight: 61.5 kg (135 lb 11.1 oz)   Height: 5' 4" (1.626 m)          Physical Exam  Vitals reviewed.   Constitutional:       General: She is not in acute distress.     Appearance: She is well-developed.   HENT:      Head: Normocephalic and atraumatic.      Right Ear: Tympanic membrane normal.   Eyes:      General: Lids are normal. No scleral icterus.     Extraocular Movements: Extraocular movements intact.      Conjunctiva/sclera: Conjunctivae normal.      Pupils: Pupils are equal, round, and reactive to light.   Pulmonary:      Effort: Pulmonary effort is normal.   Lymphadenopathy:      Head:      Right side of head: Posterior auricular adenopathy present.   Neurological:      Mental Status: She is alert and oriented to person, place, and time.      Cranial " Nerves: No cranial nerve deficit.      Gait: Gait normal.   Psychiatric:         Mood and Affect: Mood and affect normal.           Assessment:       1. Postauricular lymphadenopathy        Plan:   1. Postauricular lymphadenopathy  -     cephALEXin (KEFLEX) 500 MG capsule; Take 1 capsule (500 mg total) by mouth every 6 (six) hours. for 7 days  Dispense: 28 capsule; Refill: 0       Assessed the lump behind the patient's right ear, likely a reactive lymph node.   No fever, chills, or ear pain reported.   Prescribed Keflex (cephalexin) 500 mg to be taken every 6 hours for 1 week.   Instructed the patient to complete the antibiotic course and monitor for improvement.   Advised the patient to contact if the condition does not improve or worsens.   Plan to refer to an otolaryngologist if the condition persists or worsens after the antibiotic course.    Patient expressed understanding and agreement with plan.    Follow up if symptoms worsen or fail to improve, for keep routine follow up.    This note was generated with the assistance of ambient listening technology. Verbal consent was obtained by the patient and accompanying visitor(s) for the recording of patient appointment to facilitate this note. I attest to having reviewed and edited the generated note for accuracy, though some syntax or spelling errors may persist. Please contact the author of this note for any clarification.

## 2025-04-23 ENCOUNTER — CLINICAL SUPPORT (OUTPATIENT)
Dept: OBSTETRICS AND GYNECOLOGY | Facility: CLINIC | Age: 35
End: 2025-04-23
Payer: COMMERCIAL

## 2025-04-23 DIAGNOSIS — Z30.42 ENCOUNTER FOR DEPO-PROVERA CONTRACEPTION: Primary | ICD-10-CM

## 2025-04-23 PROCEDURE — 99999 PR PBB SHADOW E&M-EST. PATIENT-LVL II: CPT | Mod: PBBFAC,,,

## 2025-04-23 RX ORDER — MEDROXYPROGESTERONE ACETATE 150 MG/ML
150 INJECTION, SUSPENSION INTRAMUSCULAR
Status: SHIPPED | OUTPATIENT
Start: 2025-04-23 | End: 2026-07-17

## 2025-04-23 RX ADMIN — MEDROXYPROGESTERONE ACETATE 150 MG: 150 INJECTION, SUSPENSION INTRAMUSCULAR at 08:04

## 2025-04-23 NOTE — PROGRESS NOTES
150mg of medroxyprogesterone administered IM to right ventrogluteal. Patient tolerated well. No pain or discomfort noted. Advised to wait 15 minutes after in clinic. Patient verbalized understanding. Next injection scheduled.

## 2025-04-24 ENCOUNTER — TELEPHONE (OUTPATIENT)
Dept: SMOKING CESSATION | Facility: CLINIC | Age: 35
End: 2025-04-24
Payer: COMMERCIAL

## 2025-04-24 DIAGNOSIS — R55 SYNCOPE AND COLLAPSE: Primary | ICD-10-CM

## 2025-04-24 NOTE — TELEPHONE ENCOUNTER
Attempted to contact patient in regard to smoking cessation.  Left message with return contact information.

## 2025-04-28 ENCOUNTER — PATIENT MESSAGE (OUTPATIENT)
Dept: INTERNAL MEDICINE | Facility: CLINIC | Age: 35
End: 2025-04-28
Payer: COMMERCIAL

## 2025-04-28 DIAGNOSIS — G43.019 INTRACTABLE MIGRAINE WITHOUT AURA AND WITHOUT STATUS MIGRAINOSUS: ICD-10-CM

## 2025-04-28 DIAGNOSIS — R59.0 POSTAURICULAR LYMPHADENOPATHY: Primary | ICD-10-CM

## 2025-04-29 ENCOUNTER — HOSPITAL ENCOUNTER (OUTPATIENT)
Dept: CARDIOLOGY | Facility: HOSPITAL | Age: 35
Discharge: HOME | End: 2025-04-29
Attending: INTERNAL MEDICINE
Payer: COMMERCIAL

## 2025-04-29 ENCOUNTER — OFFICE VISIT (OUTPATIENT)
Dept: CARDIOLOGY | Facility: CLINIC | Age: 35
End: 2025-04-29
Payer: COMMERCIAL

## 2025-04-29 VITALS
BODY MASS INDEX: 23.34 KG/M2 | SYSTOLIC BLOOD PRESSURE: 114 MMHG | RESPIRATION RATE: 16 BRPM | HEIGHT: 64 IN | OXYGEN SATURATION: 98 % | WEIGHT: 136.69 LBS | HEART RATE: 89 BPM | DIASTOLIC BLOOD PRESSURE: 74 MMHG

## 2025-04-29 DIAGNOSIS — R51.9 CHRONIC NONINTRACTABLE HEADACHE, UNSPECIFIED HEADACHE TYPE: ICD-10-CM

## 2025-04-29 DIAGNOSIS — R55 SYNCOPE, UNSPECIFIED SYNCOPE TYPE: ICD-10-CM

## 2025-04-29 DIAGNOSIS — R55 SYNCOPE AND COLLAPSE: Primary | ICD-10-CM

## 2025-04-29 DIAGNOSIS — G89.29 CHRONIC NONINTRACTABLE HEADACHE, UNSPECIFIED HEADACHE TYPE: ICD-10-CM

## 2025-04-29 DIAGNOSIS — G43.909 MIGRAINE WITHOUT STATUS MIGRAINOSUS, NOT INTRACTABLE, UNSPECIFIED MIGRAINE TYPE: ICD-10-CM

## 2025-04-29 DIAGNOSIS — R55 SYNCOPE AND COLLAPSE: ICD-10-CM

## 2025-04-29 DIAGNOSIS — Z72.0 TOBACCO USE: ICD-10-CM

## 2025-04-29 LAB
OHS QRS DURATION: 76 MS
OHS QTC CALCULATION: 447 MS

## 2025-04-29 PROCEDURE — 99999 PR PBB SHADOW E&M-EST. PATIENT-LVL III: CPT | Mod: PBBFAC,,, | Performed by: INTERNAL MEDICINE

## 2025-04-29 PROCEDURE — 93010 ELECTROCARDIOGRAM REPORT: CPT | Mod: ,,, | Performed by: INTERNAL MEDICINE

## 2025-04-29 PROCEDURE — 93005 ELECTROCARDIOGRAM TRACING: CPT

## 2025-04-29 RX ORDER — AMITRIPTYLINE HYDROCHLORIDE 10 MG/1
10 TABLET, FILM COATED ORAL NIGHTLY PRN
COMMUNITY

## 2025-04-29 RX ORDER — SUMATRIPTAN SUCCINATE 50 MG/1
50 TABLET ORAL
COMMUNITY

## 2025-04-29 RX ORDER — MIDODRINE HYDROCHLORIDE 5 MG/1
5 TABLET ORAL 3 TIMES DAILY PRN
Qty: 90 TABLET | Refills: 1 | Status: SHIPPED | OUTPATIENT
Start: 2025-04-29 | End: 2026-04-29

## 2025-04-29 NOTE — PROGRESS NOTES
Subjective:   Patient ID:  Waldemar Mendoza is a 34 y.o. female who presents for cardiac consult of No chief complaint on file.      Referral by: No referring provider defined for this encounter.     Reason for consult: syncope      HPI  The patient came in today for cardiac consult of No chief complaint on file.      Waldemar Mendoza is a 34 y.o. female pt with migraines, vaping nicotine, fatigue, h/o syncope presents for follow up CV eval      4/30/24  BP low 90s/60s  HR 90s  ECHO 10/2023 with normal bi V function and valves.   Vital monitor 10/2023 with aVG HR 96, rare ectopy, neg for arrhythmias.     10/29/24  BP improved, less dizziness.   Weight stable.   She has not been to neuro yet - still has migraines.     4/29/25  BP and hR stable.   Does not have many presyncopal episodes overall doing better now.   She is still vaping trying to quit.  She has started meds to prevent migraines.   Had lymph node behind her ear.     ECG - NSR    Patient is compliant with medications.    Results for orders placed during the hospital encounter of 10/17/23    Echo    Interpretation Summary    Left Ventricle: The left ventricle is normal in size. Ventricular mass is normal. Normal wall thickness. Normal wall motion. There is normal systolic function with a visually estimated ejection fraction of 55 - 60%. There is normal diastolic function.    Right Ventricle: Normal right ventricular cavity size. Wall thickness is normal. Right ventricle wall motion  is normal. Systolic function is normal.    Pulmonary Artery: The estimated pulmonary artery systolic pressure is 22 mmHg.    IVC/SVC: Normal venous pressure at 3 mmHg.      No cardiac monitor results found for the past 12 months         No past medical history on file.    No past surgical history on file.    Social History     Tobacco Use    Smoking status: Every Day     Types: Vaping with nicotine, Vaping w/o nicotine    Smokeless tobacco: Never   Substance Use Topics     Alcohol use: Yes     Alcohol/week: 1.0 standard drink of alcohol     Types: 1 Drinks containing 0.5 oz of alcohol per week    Drug use: Yes     Frequency: 3.0 times per week     Types: Marijuana       Family History   Problem Relation Name Age of Onset    Asthma Maternal Grandmother Mario ku     COPD Maternal Grandmother Mario ku     Diabetes Maternal Grandmother Mario ku     Drug abuse Maternal Grandmother Mario ku     Cancer Mother Tiara casillas     Drug abuse Mother Tiara casillas     Early death Mother Tiara casillas     Asthma Brother Shane bess     Cancer Brother Shane bess     Thyroid cancer Brother Shane bess     Cancer Maternal Uncle Luís ku        Patient's Medications   New Prescriptions    No medications on file   Previous Medications    AMITRIPTYLINE (ELAVIL) 10 MG TABLET    Take 1 tablet (10 mg total) by mouth every evening.    SUMATRIPTAN (IMITREX) 50 MG TABLET    Take 1 tablet (50 mg total) by mouth daily as needed for Migraine. (Take 1 tablet by mouth at onset of migraine, can repeat in 2 hours if needed. No more than 2 tabs per day or 3 days/wk   Modified Medications    No medications on file   Discontinued Medications    No medications on file       Review of Systems   Constitutional: Negative.    HENT: Negative.     Eyes: Negative.    Respiratory: Negative.     Cardiovascular: Negative.    Gastrointestinal: Negative.    Genitourinary: Negative.    Musculoskeletal: Negative.    Skin: Negative.    Neurological:  Positive for dizziness and loss of consciousness.   Endo/Heme/Allergies: Negative.    Psychiatric/Behavioral: Negative.     All 12 systems otherwise negative.      Wt Readings from Last 3 Encounters:   04/08/25 61.5 kg (135 lb 11.1 oz)   01/07/25 59.8 kg (131 lb 11.6 oz)   11/15/24 60.4 kg (133 lb 2.5 oz)     Temp Readings from Last 3 Encounters:   11/30/23 98.1 °F (36.7 °C) (Tympanic)   10/03/23 98.2 °F (36.8 °C) (Tympanic)   01/23/23 97.8 °F (36.6 °C) (Oral)      BP Readings from Last 3 Encounters:   04/08/25 110/68   01/07/25 110/60   11/15/24 104/70     Pulse Readings from Last 3 Encounters:   04/08/25 107   01/07/25 88   11/06/24 94       LMP  (LMP Unknown)     Objective:   Physical Exam  Vitals and nursing note reviewed.   Constitutional:       General: She is not in acute distress.     Appearance: She is well-developed. She is not diaphoretic.   HENT:      Head: Normocephalic and atraumatic.      Nose: Nose normal.   Eyes:      General: No scleral icterus.     Conjunctiva/sclera: Conjunctivae normal.   Neck:      Thyroid: No thyromegaly.      Vascular: No JVD.   Cardiovascular:      Rate and Rhythm: Normal rate and regular rhythm.      Heart sounds: S1 normal and S2 normal. No murmur heard.     No friction rub. No gallop. No S3 or S4 sounds.   Pulmonary:      Effort: Pulmonary effort is normal. No respiratory distress.      Breath sounds: Normal breath sounds. No stridor. No wheezing or rales.   Chest:      Chest wall: No tenderness.   Abdominal:      General: Bowel sounds are normal. There is no distension.      Palpations: Abdomen is soft. There is no mass.      Tenderness: There is no abdominal tenderness. There is no rebound.   Genitourinary:     Comments: Deferred  Musculoskeletal:         General: No tenderness or deformity. Normal range of motion.      Cervical back: Normal range of motion and neck supple.   Lymphadenopathy:      Cervical: No cervical adenopathy.   Skin:     General: Skin is warm and dry.      Coloration: Skin is not pale.      Findings: No erythema or rash.   Neurological:      Mental Status: She is alert and oriented to person, place, and time.      Motor: No abnormal muscle tone.      Coordination: Coordination normal.   Psychiatric:         Behavior: Behavior normal.         Thought Content: Thought content normal.         Judgment: Judgment normal.         Lab Results   Component Value Date     11/15/2024    K 3.9 11/15/2024    CL  "106 11/15/2024    CO2 24 11/15/2024    BUN 10 11/15/2024    CREATININE 0.9 11/15/2024    GLU 97 11/15/2024    AST 17 11/15/2024    ALT 10 11/15/2024    ALBUMIN 4.3 11/15/2024    PROT 7.7 11/15/2024    BILITOT 0.4 11/15/2024    WBC 7.32 11/15/2024    HGB 13.3 11/15/2024    HCT 40.5 11/15/2024    MCV 87 11/15/2024     11/15/2024    TSH 0.795 11/15/2024    CHOL 144 10/10/2023    HDL 53 10/10/2023    LDLCALC 78.0 10/10/2023    TRIG 65 10/10/2023         No results found for: "BNP", "INR"       Assessment:      1. Syncope and collapse    2. Syncope, unspecified syncope type    3. Chronic nonintractable headache, unspecified headache type    4. Tobacco use    5. Migraine without status migrainosus, not intractable, unspecified migraine type              Plan:     Syncope - likely vasovagal  - stable now  - rec inc fluid intake/ Liquid IV packets   - has lost > 60 lbs in past   - rec compression stockings, inc salt intake   - ECHO 10/2023 with normal bi V function and valves.   - Vital monitor 10/2023 with aVG HR 96, rare ectopy, neg for arrhythmias.   - if recurrent rec ER eval   - restart midodrine 5mg PRN BP below 100/70    2. Migraines  - cont tx - started new treatment  - improving     3. Tobacco use - vaping  - rec cessation    Visit today included increased complexity associated with the care of the episodic problem syncope addressed and managing the longitudinal care of the patient due to the serious and/or complex managed problem(s) .       Thank you for allowing me to participate in this patient's care. Please do not hesitate to contact me with any questions or concerns. Consult note has been forwarded to the referral physician.           "

## 2025-05-01 RX ORDER — AMITRIPTYLINE HYDROCHLORIDE 10 MG/1
10 TABLET, FILM COATED ORAL NIGHTLY
Qty: 30 TABLET | Refills: 0 | Status: SHIPPED | OUTPATIENT
Start: 2025-05-01 | End: 2025-05-31

## 2025-05-06 ENCOUNTER — OFFICE VISIT (OUTPATIENT)
Dept: OTOLARYNGOLOGY | Facility: CLINIC | Age: 35
End: 2025-05-06
Payer: COMMERCIAL

## 2025-05-06 VITALS — BODY MASS INDEX: 23.46 KG/M2 | HEIGHT: 64 IN

## 2025-05-06 DIAGNOSIS — R59.0 POSTAURICULAR LYMPHADENOPATHY: ICD-10-CM

## 2025-05-06 PROCEDURE — 3008F BODY MASS INDEX DOCD: CPT | Mod: CPTII,S$GLB,, | Performed by: STUDENT IN AN ORGANIZED HEALTH CARE EDUCATION/TRAINING PROGRAM

## 2025-05-06 PROCEDURE — 1159F MED LIST DOCD IN RCRD: CPT | Mod: CPTII,S$GLB,, | Performed by: STUDENT IN AN ORGANIZED HEALTH CARE EDUCATION/TRAINING PROGRAM

## 2025-05-06 PROCEDURE — 99203 OFFICE O/P NEW LOW 30 MIN: CPT | Mod: S$GLB,,, | Performed by: STUDENT IN AN ORGANIZED HEALTH CARE EDUCATION/TRAINING PROGRAM

## 2025-05-06 PROCEDURE — 99999 PR PBB SHADOW E&M-EST. PATIENT-LVL III: CPT | Mod: PBBFAC,,, | Performed by: STUDENT IN AN ORGANIZED HEALTH CARE EDUCATION/TRAINING PROGRAM

## 2025-05-06 NOTE — PROGRESS NOTES
Chief complaint:    Chief Complaint   Patient presents with    Referral     Referred by Bridgette Farah PA-C for postauricular lymphadenopathy. Patient states it does not hurt its just sore.           Referring Provider:  Bridgette Farah Pa-c  [address] [place] [address]      History of present illness:   Ms. Mendoza is a 34 y.o. presenting for evaluation of lump behind right ear.     She  has been referred by Dr. Farah.        She states that about  3 months ago she noted a mass located at right ear/upper neck. Since that time it has been the same.  Denies pain, rare tenderness. No decreased ROM.    No inciting illness  Was given keflex 1 month ago with no relief.    No ear pain, trouble hearing.  She denies sore throat, dysphagia, otalgia, voice change, hemoptysis. No facial weakness     No history of face/scalp/neck cutaneous cancers.          History      Past Medical History: No past medical history on file.      Past Surgical History:No past surgical history on file.      Medications: Medication list [facility] and was reviewed. She  has a current medication list which includes the following prescription(s): amitriptyline, amitriptyline, midodrine, and sumatriptan, and the following Facility-Administered Medications: medroxyprogesterone and medroxyprogesterone.     Allergies: Review of patient's allergies indicates:  No Known Allergies      Family history: family history includes Asthma in her brother and maternal grandmother; COPD in her maternal grandmother; Cancer in her brother, maternal uncle, and mother; Diabetes in her maternal grandmother; Drug abuse in her maternal grandmother and mother; Early death in her mother; Thyroid cancer in her brother.         Social History          Alcohol use:  reports current alcohol use of about 1.0 standard drink of alcohol per week.            Tobacco:  reports that she has been smoking vaping with nicotine and vaping w/o  "nicotine. She has never used smokeless tobacco.         Physical Examination      Vitals: Height 5' 4" (1.626 m).       General appearance of patient: healthy and no distress       Quality of voice: no dysphonia, no dysarthria       Inspection of head and face: Normocephalic, without obvious abnormality, sinuses nontender to percussion       Bilateral parotid glands soft, nontender, no swelling, no masses/lesions       Facial strength: intact, symmetric       Extraocular movements intact       Nose: external nose without external deformity, nasal mucosa normal, septum midline       Ear canals, external ears, TMs: normal bilaterally       Oral cavity: tongue mobile, FOM soft, mucosa healthy throughout with no masses, lesions, ulcerations       Oropharynx: mucosa of tonsillar fossae healthy; soft palate/uvula intact, mobile, mucosa healthy; tongue base soft, nontender; no mucosal ulcerations or masses or other worrisome lesions      Neck: soft, supple, no masses or palpable lymph nodes     Mobile ~1 cm posterior auricular nodes, non tender     Thyroid: normal size, nontender, no nodules palpable          Data reviewed      Review of records:      I reviewed records from the referring provider's office visits, including the history, workup, and/or treatment of this problem thus far.      Assessment/Plan:    1. Postauricular lymphadenopathy        Reassuring history, exam, and imaging that is most consistent with reactive adenopathy. We discussed the underlying etiology and natural history. We discussed that while the risk of malignancy is exceedingly low, it cannot be ruled out without excision. We discussed options including observation, repeat imaging, and excision, and the risks and benefits of each. The patient elected for observation. She will return if symptoms change or worsen.    Firm area behind the ear corresponds to mastoid bone; she was reassured      Joce Ortiz MD  [facility] "   [facility]  [address] [address] [facility].  [place] [id number]  P: [phone]  F: [phone]

## 2025-06-05 ENCOUNTER — HOSPITAL ENCOUNTER (EMERGENCY)
Facility: HOSPITAL | Age: 35
Discharge: HOME | End: 2025-06-05
Attending: EMERGENCY MEDICINE
Payer: COMMERCIAL

## 2025-06-05 VITALS
OXYGEN SATURATION: 99 % | HEIGHT: 64 IN | WEIGHT: 133.63 LBS | RESPIRATION RATE: 16 BRPM | BODY MASS INDEX: 22.81 KG/M2 | SYSTOLIC BLOOD PRESSURE: 128 MMHG | DIASTOLIC BLOOD PRESSURE: 64 MMHG | TEMPERATURE: 99 F | HEART RATE: 100 BPM

## 2025-06-05 DIAGNOSIS — R10.32 LLQ PAIN: ICD-10-CM

## 2025-06-05 DIAGNOSIS — N20.0 NEPHROLITHIASIS: ICD-10-CM

## 2025-06-05 DIAGNOSIS — R21 RASH AND NONSPECIFIC SKIN ERUPTION: ICD-10-CM
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
## 2025-06-05 LAB
ABSOLUTE EOSINOPHIL (OHS): 0.05 K/UL
ABSOLUTE MONOCYTE (OHS): 0.39 K/UL (ref 0.3–1)
ABSOLUTE NEUTROPHIL COUNT (OHS): 5.63 K/UL (ref 1.8–7.7)
ALBUMIN SERPL BCP-MCNC: 4.8 G/DL (ref 3.5–5.2)
ALP SERPL-CCNC: 78 UNIT/L (ref 40–150)
ALT SERPL W/O P-5'-P-CCNC: 12 UNIT/L (ref 10–44)
ANION GAP (OHS): 11 MMOL/L (ref 8–16)
AST SERPL-CCNC: 19 UNIT/L (ref 11–45)
B-HCG UR QL: NEGATIVE
BASOPHILS # BLD AUTO: 0.03 K/UL
BASOPHILS NFR BLD AUTO: 0.3 %
BILIRUB SERPL-MCNC: 0.5 MG/DL (ref 0.1–1)
BUN SERPL-MCNC: 9 MG/DL (ref 6–20)
CALCIUM SERPL-MCNC: 9.7 MG/DL (ref 8.7–10.5)
CHLORIDE SERPL-SCNC: 104 MMOL/L (ref 95–110)
CO2 SERPL-SCNC: 22 MMOL/L (ref 23–29)
CREAT SERPL-MCNC: 1 MG/DL (ref 0.5–1.4)
ERYTHROCYTE [DISTWIDTH] IN BLOOD BY AUTOMATED COUNT: 11.9 % (ref 11.5–14.5)
GFR SERPLBLD CREATININE-BSD FMLA CKD-EPI: >60 ML/MIN/1.73/M2
GLUCOSE SERPL-MCNC: 96 MG/DL (ref 70–110)
HCT VFR BLD AUTO: 42.6 % (ref 37–48.5)
HGB BLD-MCNC: 14.1 GM/DL (ref 12–16)
IMM GRANULOCYTES # BLD AUTO: 0.03 K/UL (ref 0–0.04)
IMM GRANULOCYTES NFR BLD AUTO: 0.3 % (ref 0–0.5)
LYMPHOCYTES # BLD AUTO: 2.61 K/UL (ref 1–4.8)
MCH RBC QN AUTO: 28.5 PG (ref 27–31)
MCHC RBC AUTO-ENTMCNC: 33.1 G/DL (ref 32–36)
MCV RBC AUTO: 86 FL (ref 82–98)
NUCLEATED RBC (/100WBC) (OHS): 0 /100 WBC
PLATELET # BLD AUTO: 302 K/UL (ref 150–450)
PMV BLD AUTO: 9.6 FL (ref 9.2–12.9)
POTASSIUM SERPL-SCNC: 3.5 MMOL/L (ref 3.5–5.1)
PROT SERPL-MCNC: 8.9 GM/DL (ref 6–8.4)
RBC # BLD AUTO: 4.94 M/UL (ref 4–5.4)
RELATIVE EOSINOPHIL (OHS): 0.6 %
RELATIVE LYMPHOCYTE (OHS): 29.9 % (ref 18–48)
RELATIVE MONOCYTE (OHS): 4.5 % (ref 4–15)
RELATIVE NEUTROPHIL (OHS): 64.4 % (ref 38–73)
SODIUM SERPL-SCNC: 137 MMOL/L (ref 136–145)
WBC # BLD AUTO: 8.74 K/UL (ref 3.9–12.7)

## 2025-06-05 PROCEDURE — 80053 COMPREHEN METABOLIC PANEL: CPT

## 2025-06-05 PROCEDURE — 81025 URINE PREGNANCY TEST: CPT

## 2025-06-05 PROCEDURE — 25000003 PHARM REV CODE 250

## 2025-06-05 PROCEDURE — 85025 COMPLETE CBC W/AUTO DIFF WBC: CPT

## 2025-06-05 PROCEDURE — 99285 EMERGENCY DEPT VISIT HI MDM: CPT | Mod: 25

## 2025-06-05 PROCEDURE — 96375 TX/PRO/DX INJ NEW DRUG ADDON: CPT

## 2025-06-05 PROCEDURE — 96374 THER/PROPH/DIAG INJ IV PUSH: CPT

## 2025-06-05 PROCEDURE — 25500020 PHARM REV CODE 255

## 2025-06-05 PROCEDURE — 63600175 PHARM REV CODE 636 W HCPCS: Mod: JZ,TB

## 2025-06-05 RX ORDER — KETOROLAC TROMETHAMINE 30 MG/ML
15 INJECTION, SOLUTION INTRAMUSCULAR; INTRAVENOUS
Status: COMPLETED | OUTPATIENT
Start: 2025-06-05 | End: 2025-06-05

## 2025-06-05 RX ORDER — METHOCARBAMOL 500 MG/1
500 TABLET, FILM COATED ORAL 4 TIMES DAILY
Qty: 40 TABLET | Refills: 0 | Status: SHIPPED | OUTPATIENT
Start: 2025-06-05 | End: 2025-06-15

## 2025-06-05 RX ORDER — KETOROLAC TROMETHAMINE 10 MG/1
10 TABLET, FILM COATED ORAL EVERY 6 HOURS
Qty: 20 TABLET | Refills: 0 | Status: SHIPPED | OUTPATIENT
Start: 2025-06-05 | End: 2025-06-10

## 2025-06-05 RX ORDER — TAMSULOSIN HYDROCHLORIDE 0.4 MG/1
0.4 CAPSULE ORAL DAILY
Qty: 10 CAPSULE | Refills: 0 | Status: SHIPPED | OUTPATIENT
Start: 2025-06-05 | End: 2026-06-05

## 2025-06-05 RX ORDER — METHYLPREDNISOLONE 4 MG/1
TABLET ORAL
Qty: 21 EACH | Refills: 0 | Status: SHIPPED | OUTPATIENT
Start: 2025-06-05 | End: 2025-06-26

## 2025-06-05 RX ORDER — DEXAMETHASONE SODIUM PHOSPHATE 4 MG/ML
4 INJECTION, SOLUTION INTRA-ARTICULAR; INTRALESIONAL; INTRAMUSCULAR; INTRAVENOUS; SOFT TISSUE
Status: COMPLETED | OUTPATIENT
Start: 2025-06-05 | End: 2025-06-05

## 2025-06-05 RX ORDER — CETIRIZINE HYDROCHLORIDE 10 MG/1
10 TABLET ORAL
Status: COMPLETED | OUTPATIENT
Start: 2025-06-05 | End: 2025-06-05

## 2025-06-05 RX ORDER — ONDANSETRON 4 MG/1
4 TABLET, FILM COATED ORAL EVERY 6 HOURS
Qty: 12 TABLET | Refills: 0 | Status: SHIPPED | OUTPATIENT
Start: 2025-06-05

## 2025-06-05 RX ORDER — CETIRIZINE HYDROCHLORIDE 10 MG/1
10 TABLET ORAL DAILY
Qty: 30 TABLET | Refills: 0 | Status: SHIPPED | OUTPATIENT
Start: 2025-06-05 | End: 2026-06-05

## 2025-06-05 RX ADMIN — DEXAMETHASONE SODIUM PHOSPHATE 4 MG: 4 INJECTION INTRA-ARTICULAR; INTRALESIONAL; INTRAMUSCULAR; INTRAVENOUS; SOFT TISSUE at 08:06

## 2025-06-05 RX ADMIN — IOHEXOL 100 ML: 350 INJECTION, SOLUTION INTRAVENOUS at 08:06

## 2025-06-05 RX ADMIN — KETOROLAC TROMETHAMINE 15 MG: 30 INJECTION, SOLUTION INTRAMUSCULAR at 08:06

## 2025-06-05 RX ADMIN — CETIRIZINE HYDROCHLORIDE 10 MG: 10 TABLET, FILM COATED ORAL at 07:06

## 2025-06-05 NOTE — Clinical Note
"Waldemar Mendozaie" Kelly was seen and treated in our emergency department on 6/5/2025.  She may return to work on 06/09/2025.  May return sooner if she is well enough.      If you have any questions or concerns, please don't hesitate to call.      Shakila Raygoza PA-C"
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
## 2025-08-05 ENCOUNTER — TELEPHONE (OUTPATIENT)
Dept: OBSTETRICS AND GYNECOLOGY | Facility: CLINIC | Age: 35
End: 2025-08-05
Payer: COMMERCIAL

## 2025-08-05 DIAGNOSIS — Z30.42 ENCOUNTER FOR DEPO-PROVERA CONTRACEPTION: Primary | ICD-10-CM

## 2025-08-05 NOTE — TELEPHONE ENCOUNTER
Copied from CRM #[id number]. Topic: Appointments - Appointment Access  >> Aug 5, 2025 10:16 AM Elham wrote:  .Type:  Patient Request Call Back    Who Called:Waldemar    Does the patient know what this is regarding?:Patient is requesting to re-schedule missed depo injection    Would the patient rather a call back or a response via [facility]? call    Best Call Back Number:.[phone] (home)      Additional Information:

## 2025-08-05 NOTE — TELEPHONE ENCOUNTER
Called patient and she is outside of her window for depo.  Scheduled beta and if negative, injection the next day.  Patient verbalized understanding.

## 2025-08-07 ENCOUNTER — LAB VISIT (OUTPATIENT)
Dept: LAB | Facility: HOSPITAL | Age: 35
End: 2025-08-07
Attending: NURSE PRACTITIONER
Payer: COMMERCIAL

## 2025-08-07 DIAGNOSIS — Z30.42 ENCOUNTER FOR DEPO-PROVERA CONTRACEPTION: ICD-10-CM

## 2025-08-07 LAB — HCG INTACT+B SERPL-ACNC: <2.42 MIU/ML

## 2025-08-07 PROCEDURE — 84702 CHORIONIC GONADOTROPIN TEST: CPT

## 2025-08-07 PROCEDURE — 36415 COLL VENOUS BLD VENIPUNCTURE: CPT

## 2025-08-08 ENCOUNTER — CLINICAL SUPPORT (OUTPATIENT)
Dept: OBSTETRICS AND GYNECOLOGY | Facility: CLINIC | Age: 35
End: 2025-08-08
Payer: COMMERCIAL

## 2025-08-08 DIAGNOSIS — Z30.42 ENCOUNTER FOR DEPO-PROVERA CONTRACEPTION: Primary | ICD-10-CM

## 2025-08-08 PROCEDURE — 99999 PR PBB SHADOW E&M-EST. PATIENT-LVL II: CPT | Mod: PBBFAC,,,

## 2025-08-08 RX ADMIN — MEDROXYPROGESTERONE ACETATE 150 MG: 150 INJECTION, SUSPENSION INTRAMUSCULAR at 08:08

## 2025-08-08 NOTE — PROGRESS NOTES
Beta hcg negative on 08/07/25. Ok to give depo elisabet Baum NP. 150mg of medroxyprogesterone administered IM to left ventrogluteal. Patient tolerated well. No pain or discomfort noted. Advised to wait 15 minutes after in clinic. Patient verbalized understanding. Next injection scheduled.